# Patient Record
Sex: FEMALE | Race: WHITE | NOT HISPANIC OR LATINO
[De-identification: names, ages, dates, MRNs, and addresses within clinical notes are randomized per-mention and may not be internally consistent; named-entity substitution may affect disease eponyms.]

---

## 2017-03-06 ENCOUNTER — RESULT REVIEW (OUTPATIENT)
Age: 72
End: 2017-03-06

## 2017-11-01 ENCOUNTER — EMERGENCY (EMERGENCY)
Facility: HOSPITAL | Age: 72
LOS: 0 days | Discharge: HOME | End: 2017-11-01
Attending: EMERGENCY MEDICINE | Admitting: EMERGENCY MEDICINE
Payer: MEDICARE

## 2017-11-01 VITALS
HEART RATE: 80 BPM | SYSTOLIC BLOOD PRESSURE: 158 MMHG | RESPIRATION RATE: 18 BRPM | DIASTOLIC BLOOD PRESSURE: 102 MMHG | OXYGEN SATURATION: 100 % | TEMPERATURE: 97 F

## 2017-11-01 VITALS — WEIGHT: 110.01 LBS

## 2017-11-01 PROCEDURE — 99284 EMERGENCY DEPT VISIT MOD MDM: CPT

## 2017-11-01 PROCEDURE — 70450 CT HEAD/BRAIN W/O DYE: CPT | Mod: 26

## 2017-11-01 PROCEDURE — 93010 ELECTROCARDIOGRAM REPORT: CPT

## 2017-11-01 RX ADMIN — Medication 0.1 MILLIGRAM(S): at 15:29

## 2017-11-01 RX ADMIN — Medication 0.1 MILLIGRAM(S): at 14:32

## 2017-11-01 NOTE — ED ADULT TRIAGE NOTE - CHIEF COMPLAINT QUOTE
pt  tripped   and fell today lac to upper lip no loc denies blood thinner to meet DR Garcia ,plastic surgeon

## 2017-11-01 NOTE — ED STATDOCS - PROGRESS NOTE DETAILS
73 yo female presents s/p trip and fall while running and fell forward, sustained a laceration to the lip and abrasion to the face. Last tdap appr 1 year ago. Plastics to suture wound. -Markie Bower PA-C

## 2017-11-01 NOTE — ED STATDOCS - OBJECTIVE STATEMENT
73 yo otherwise healthy female presents with lip laceration s/p trip and fall while running today.  Came in to see Plastics Dr. Garcia.  No blood thinners.  Patient is hypertensive in ED which is not normal for her.

## 2017-11-01 NOTE — ED STATDOCS - CARE PLAN
Principal Discharge DX:	Lip laceration, initial encounter  Secondary Diagnosis:	Facial injury, initial encounter

## 2017-11-01 NOTE — ED STATDOCS - ATTENDING CONTRIBUTION TO CARE
I, Ricky Crump, performed the initial face to face bedside interview with this patient regarding history of present illness, review of symptoms and relevant past medical, social and family history.  I completed an independent physical examination.  I was the initial provider who evaluated this patient. I have signed out the follow up of any pending tests (i.e. labs, radiological studies) to the ACP.  I have communicated the patient’s plan of care and disposition with the ACP.  The history, relevant review of systems, past medical and surgical history, medical decision making, and physical examination was documented by the scribe in my presence and I attest to the accuracy of the documentation.
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
## 2017-11-11 DIAGNOSIS — S01.511A LACERATION WITHOUT FOREIGN BODY OF LIP, INITIAL ENCOUNTER: ICD-10-CM
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
## 2017-11-11 DIAGNOSIS — S01.81XA LACERATION WITHOUT FOREIGN BODY OF OTHER PART OF HEAD, INITIAL ENCOUNTER: ICD-10-CM

## 2020-05-11 ENCOUNTER — APPOINTMENT (OUTPATIENT)
Dept: GASTROENTEROLOGY | Facility: CLINIC | Age: 75
End: 2020-05-11
Payer: MEDICARE

## 2020-05-11 PROCEDURE — 99442: CPT | Mod: CR

## 2020-05-16 ENCOUNTER — APPOINTMENT (OUTPATIENT)
Dept: DISASTER EMERGENCY | Facility: CLINIC | Age: 75
End: 2020-05-16

## 2020-05-17 LAB — SARS-COV-2 N GENE NPH QL NAA+PROBE: NOT DETECTED

## 2020-05-19 ENCOUNTER — APPOINTMENT (OUTPATIENT)
Dept: DISASTER EMERGENCY | Facility: CLINIC | Age: 75
End: 2020-05-19

## 2020-05-19 DIAGNOSIS — Z01.818 ENCOUNTER FOR OTHER PREPROCEDURAL EXAMINATION: ICD-10-CM

## 2020-05-20 LAB — SARS-COV-2 N GENE NPH QL NAA+PROBE: NOT DETECTED

## 2020-05-21 ENCOUNTER — APPOINTMENT (OUTPATIENT)
Dept: GASTROENTEROLOGY | Facility: AMBULATORY MEDICAL SERVICES | Age: 75
End: 2020-05-21
Payer: MEDICARE

## 2020-05-21 ENCOUNTER — RESULT REVIEW (OUTPATIENT)
Age: 75
End: 2020-05-21

## 2020-05-21 PROCEDURE — 43239 EGD BIOPSY SINGLE/MULTIPLE: CPT

## 2020-06-25 ENCOUNTER — APPOINTMENT (OUTPATIENT)
Dept: GASTROENTEROLOGY | Facility: CLINIC | Age: 75
End: 2020-06-25
Payer: MEDICARE

## 2020-06-25 PROCEDURE — 99441: CPT | Mod: 95

## 2020-06-28 NOTE — ASSESSMENT
[FreeTextEntry1] : 76yo female with anemia and gastric ulcer\par 1. anemia check labs in 2 weeks\par if not improving will check repeat egd to document healing of ulcer and colonoscopy as none in 3 years\par She could have concomitant colon lesion

## 2020-06-28 NOTE — REVIEW OF SYSTEMS
[Feeling Poorly] : feeling poorly [Feeling Tired] : feeling tired [FreeTextEntry2] : now improving [Negative] : Heme/Lymph

## 2020-06-28 NOTE — HISTORY OF PRESENT ILLNESS
[Home] : at home, [unfilled] , at the time of the visit. [Spouse] : spouse [Verbal consent obtained from patient] : the patient, [unfilled] [Medical Office: ([facility])___] : at the medical office located in  [de-identified] : 74yo male telephonic visit \par Hx started with hgb 7.  ON egd she had ulcer and treating with PPI. However, still some fatigue\par She is now on iron and improving\par By report, her last blood work was done weeks ago and she still had significant anemia

## 2020-08-10 DIAGNOSIS — D62 ACUTE POSTHEMORRHAGIC ANEMIA: ICD-10-CM

## 2020-08-14 LAB
ALBUMIN SERPL ELPH-MCNC: 4.7 G/DL
ALP BLD-CCNC: 54 U/L
ALT SERPL-CCNC: 23 U/L
ANION GAP SERPL CALC-SCNC: 14 MMOL/L
AST SERPL-CCNC: 28 U/L
BASOPHILS # BLD AUTO: 0.08 K/UL
BASOPHILS NFR BLD AUTO: 1.8 %
BILIRUB SERPL-MCNC: 0.3 MG/DL
BUN SERPL-MCNC: 12 MG/DL
CALCIUM SERPL-MCNC: 10.3 MG/DL
CHLORIDE SERPL-SCNC: 99 MMOL/L
CO2 SERPL-SCNC: 26 MMOL/L
CREAT SERPL-MCNC: 0.57 MG/DL
EOSINOPHIL # BLD AUTO: 0.26 K/UL
EOSINOPHIL NFR BLD AUTO: 5.7 %
FERRITIN SERPL-MCNC: 59 NG/ML
GLUCOSE SERPL-MCNC: 81 MG/DL
HCT VFR BLD CALC: 44.3 %
HGB BLD-MCNC: 14.1 G/DL
IMM GRANULOCYTES NFR BLD AUTO: 0.2 %
IRON SATN MFR SERPL: 37 %
IRON SERPL-MCNC: 128 UG/DL
LYMPHOCYTES # BLD AUTO: 0.98 K/UL
LYMPHOCYTES NFR BLD AUTO: 21.4 %
MAN DIFF?: NORMAL
MCHC RBC-ENTMCNC: 27.6 PG
MCHC RBC-ENTMCNC: 31.8 GM/DL
MCV RBC AUTO: 86.7 FL
MONOCYTES # BLD AUTO: 0.69 K/UL
MONOCYTES NFR BLD AUTO: 15.1 %
NEUTROPHILS # BLD AUTO: 2.55 K/UL
NEUTROPHILS NFR BLD AUTO: 55.8 %
PLATELET # BLD AUTO: 312 K/UL
POTASSIUM SERPL-SCNC: 4.1 MMOL/L
PROT SERPL-MCNC: 6.7 G/DL
RBC # BLD: 5.11 M/UL
RBC # FLD: NORMAL
SODIUM SERPL-SCNC: 138 MMOL/L
TIBC SERPL-MCNC: 345 UG/DL
UIBC SERPL-MCNC: 217 UG/DL
WBC # FLD AUTO: 4.57 K/UL

## 2020-11-09 ENCOUNTER — RX RENEWAL (OUTPATIENT)
Age: 75
End: 2020-11-09

## 2021-05-03 ENCOUNTER — RX RENEWAL (OUTPATIENT)
Age: 76
End: 2021-05-03

## 2021-09-09 ENCOUNTER — APPOINTMENT (OUTPATIENT)
Dept: INTERNAL MEDICINE | Facility: CLINIC | Age: 76
End: 2021-09-09
Payer: MEDICARE

## 2021-09-09 VITALS
RESPIRATION RATE: 14 BRPM | HEART RATE: 88 BPM | HEIGHT: 64 IN | BODY MASS INDEX: 19.46 KG/M2 | WEIGHT: 114 LBS | TEMPERATURE: 97.6 F | DIASTOLIC BLOOD PRESSURE: 80 MMHG | SYSTOLIC BLOOD PRESSURE: 136 MMHG

## 2021-09-09 VITALS — OXYGEN SATURATION: 97 %

## 2021-09-09 DIAGNOSIS — Z87.39 PERSONAL HISTORY OF OTHER DISEASES OF THE MUSCULOSKELETAL SYSTEM AND CONNECTIVE TISSUE: ICD-10-CM

## 2021-09-09 DIAGNOSIS — Z87.19 PERSONAL HISTORY OF OTHER DISEASES OF THE DIGESTIVE SYSTEM: ICD-10-CM

## 2021-09-09 DIAGNOSIS — R92.8 OTHER ABNORMAL AND INCONCLUSIVE FINDINGS ON DIAGNOSTIC IMAGING OF BREAST: ICD-10-CM

## 2021-09-09 PROCEDURE — 99204 OFFICE O/P NEW MOD 45 MIN: CPT

## 2021-09-09 NOTE — HEALTH RISK ASSESSMENT
[Good] : ~his/her~  mood as  good [No] : No [No falls in past year] : Patient reported no falls in the past year [With Significant Other] : lives with significant other [] :  [Fully functional (bathing, dressing, toileting, transferring, walking, feeding)] : Fully functional (bathing, dressing, toileting, transferring, walking, feeding) [] : No 167.64

## 2021-09-09 NOTE — REVIEW OF SYSTEMS
[Memory Loss] : memory loss [Fever] : no fever [Chills] : no chills [Fatigue] : no fatigue [Pain] : no pain [Earache] : no earache [Nasal Discharge] : no nasal discharge [Sore Throat] : no sore throat [Chest Pain] : no chest pain [Palpitations] : no palpitations [Lower Ext Edema] : no lower extremity edema [Shortness Of Breath] : no shortness of breath [Cough] : no cough [Abdominal Pain] : no abdominal pain [Vomiting] : no vomiting [Heartburn] : no heartburn [Melena] : no melena [Dysuria] : no dysuria [Itching] : no itching [Headache] : no headache [Dizziness] : no dizziness [Fainting] : no fainting

## 2021-09-09 NOTE — PHYSICAL EXAM
[No Acute Distress] : no acute distress [Well Nourished] : well nourished [Well Developed] : well developed [Well-Appearing] : well-appearing [Normal Voice/Communication] : normal voice/communication [Normal Sclera/Conjunctiva] : normal sclera/conjunctiva [PERRL] : pupils equal round and reactive to light [Normal Outer Ear/Nose] : the outer ears and nose were normal in appearance [Supple] : supple [No Respiratory Distress] : no respiratory distress  [Clear to Auscultation] : lungs were clear to auscultation bilaterally [Normal Rate] : normal rate  [Normal S1, S2] : normal S1 and S2 [Pedal Pulses Present] : the pedal pulses are present [No Edema] : there was no peripheral edema [Soft] : abdomen soft [Non Tender] : non-tender [Normal Bowel Sounds] : normal bowel sounds [Grossly Normal Strength/Tone] : grossly normal strength/tone [Coordination Grossly Intact] : coordination grossly intact [Normal Gait] : normal gait [Speech Grossly Normal] : speech grossly normal [Normal Mood] : the mood was normal [de-identified] : Alert. Is not oriented to time (does not know year/month/day). Able to recall children's names but can't remember where they both live or how many grandchildren she has. Does not know president.

## 2021-09-09 NOTE — HISTORY OF PRESENT ILLNESS
[Spouse] : spouse [FreeTextEntry1] : ADRIÁN.  [de-identified] : 77 y/o is in the office to establish care.  Patient reports she is doing well overall.  Presents to  office with  who states she does have memory loss.  Patient is able to shower herself, dress herself, use the bathroom, cleans the house and does the laundry.  Has been states she can power walk about 2 miles. \par \par Sees rheumatologist for fibromyalgia.  On duloxetine with good effect.  Patient's  reports that memory loss has been discussed with rheumatologist.  Supportive therapy (diet, sleep, exercise) was recommended.\par \par Patient also sees Dr. Rolle in [place] for GERD.  On pantoprazole.  Patient denies any abdominal pain or blood in the stool.  Patient's  states the last time she had a colonoscopy it took 6 months for her to recover.  Does not want to do this again.

## 2021-09-09 NOTE — PLAN
[FreeTextEntry1] : 76-year-old female for new patient office visit.\par \par Memory loss. Referral to neurology was advised for further work-up.  However, patient's  declined.  Routine blood work performed today.  Patient's  was encouraged to continue supportive therapy.\par \par GERD.  Patient to continue on pantoprazole.  Keep follow-up with GI.\par \par Fibromyalgia.  Stable on duloxetine.  Recommendations for further management per rheumatology.\par \par Health maintenance.  On review of documentation it looks as though patient did have a history of abnormal mammogram in 2014.  MRI performed after was found to be negative.  Repeat imaging was recommended in 6 months.  Patient's  states she had this done about two years ago and was fine. He declines any further breast screening for pt at this time. \par \par UTD w/ COVID vaccine. \par \par Pt  also declines any further screening for colon ca. \par \par Risks of declination to proposed testing/referral as above discussed in detail. Pt  is aware and understands these risks. Does not want any further work-up for pt at this time. \par \par All questions answered. Pt  voiced understanding and in agreement to above plan. RTC in 3 mths for f/u.

## 2021-09-13 LAB
ALBUMIN SERPL ELPH-MCNC: 4.8 G/DL
ALP BLD-CCNC: 59 U/L
ALT SERPL-CCNC: 19 U/L
ANION GAP SERPL CALC-SCNC: 12 MMOL/L
AST SERPL-CCNC: 22 U/L
BASOPHILS # BLD AUTO: 0.08 K/UL
BASOPHILS NFR BLD AUTO: 1.3 %
BILIRUB SERPL-MCNC: 0.4 MG/DL
BUN SERPL-MCNC: 11 MG/DL
CALCIUM SERPL-MCNC: 10.2 MG/DL
CHLORIDE SERPL-SCNC: 100 MMOL/L
CO2 SERPL-SCNC: 28 MMOL/L
CREAT SERPL-MCNC: 0.58 MG/DL
EOSINOPHIL # BLD AUTO: 0.72 K/UL
EOSINOPHIL NFR BLD AUTO: 11.7 %
ESTIMATED AVERAGE GLUCOSE: 108 MG/DL
FOLATE SERPL-MCNC: >20 NG/ML
GLUCOSE SERPL-MCNC: 106 MG/DL
HBA1C MFR BLD HPLC: 5.4 %
HCT VFR BLD CALC: 46 %
HGB BLD-MCNC: 15.2 G/DL
IMM GRANULOCYTES NFR BLD AUTO: 0.3 %
LYMPHOCYTES # BLD AUTO: 0.8 K/UL
LYMPHOCYTES NFR BLD AUTO: 13 %
MAN DIFF?: NORMAL
MCHC RBC-ENTMCNC: 31.4 PG
MCHC RBC-ENTMCNC: 33 GM/DL
MCV RBC AUTO: 95 FL
MONOCYTES # BLD AUTO: 0.74 K/UL
MONOCYTES NFR BLD AUTO: 12 %
NEUTROPHILS # BLD AUTO: 3.8 K/UL
NEUTROPHILS NFR BLD AUTO: 61.7 %
PLATELET # BLD AUTO: 322 K/UL
POTASSIUM SERPL-SCNC: 3.5 MMOL/L
PROT SERPL-MCNC: 6.8 G/DL
RBC # BLD: 4.84 M/UL
RBC # FLD: 12.7 %
SODIUM SERPL-SCNC: 140 MMOL/L
TSH SERPL-ACNC: 2.38 UIU/ML
VIT B12 SERPL-MCNC: 1451 PG/ML
WBC # FLD AUTO: 6.16 K/UL

## 2021-10-28 ENCOUNTER — RX RENEWAL (OUTPATIENT)
Age: 76
End: 2021-10-28

## 2021-12-08 ENCOUNTER — RX RENEWAL (OUTPATIENT)
Age: 76
End: 2021-12-08

## 2021-12-16 ENCOUNTER — APPOINTMENT (OUTPATIENT)
Dept: INTERNAL MEDICINE | Facility: CLINIC | Age: 76
End: 2021-12-16
Payer: MEDICARE

## 2021-12-16 ENCOUNTER — NON-APPOINTMENT (OUTPATIENT)
Age: 76
End: 2021-12-16

## 2021-12-16 VITALS
DIASTOLIC BLOOD PRESSURE: 60 MMHG | RESPIRATION RATE: 14 BRPM | HEIGHT: 64 IN | HEART RATE: 84 BPM | SYSTOLIC BLOOD PRESSURE: 122 MMHG | WEIGHT: 114 LBS | OXYGEN SATURATION: 97 % | BODY MASS INDEX: 19.46 KG/M2 | TEMPERATURE: 97.6 F

## 2021-12-16 PROCEDURE — 99212 OFFICE O/P EST SF 10 MIN: CPT

## 2021-12-16 NOTE — HISTORY OF PRESENT ILLNESS
[Spouse] : spouse [FreeTextEntry1] : F/U memory loss [de-identified] : 77 y/o presents for follow up of memory loss. Pt states other than chronic pain from arthritis she is doing well. Activity level remains above average. Spouse does not note any new changes. She eating/drinking/voiding WNL. No questions/concerns at this time.

## 2021-12-16 NOTE — PHYSICAL EXAM
[No Acute Distress] : no acute distress [Well Nourished] : well nourished [Well Developed] : well developed [Well-Appearing] : well-appearing [Normal Voice/Communication] : normal voice/communication [Normal Sclera/Conjunctiva] : normal sclera/conjunctiva [PERRL] : pupils equal round and reactive to light [Normal Outer Ear/Nose] : the outer ears and nose were normal in appearance [Supple] : supple [No Respiratory Distress] : no respiratory distress  [Clear to Auscultation] : lungs were clear to auscultation bilaterally [Normal Rate] : normal rate  [Normal S1, S2] : normal S1 and S2 [Pedal Pulses Present] : the pedal pulses are present [No Edema] : there was no peripheral edema [Soft] : abdomen soft [Non Tender] : non-tender [Normal Bowel Sounds] : normal bowel sounds [Grossly Normal Strength/Tone] : grossly normal strength/tone [Coordination Grossly Intact] : coordination grossly intact [Normal Gait] : normal gait [Speech Grossly Normal] : speech grossly normal [Normal Mood] : the mood was normal [de-identified] : Alert. At baseline.

## 2021-12-16 NOTE — PLAN
[FreeTextEntry1] : 76-year-old female for follow-up of memory loss.  No new changes noted at this time.  Patient's spouse declines any further work-up in this regard currently.  Continue appropriate diet and exercise.\par \par Health maintenance.  Patient has received both flu shot and booster dose for Covid.\par \par All questions answered.  Patient and spouse voiced understanding and agreement above plan.  Return to clinic as recommended.

## 2022-03-10 ENCOUNTER — APPOINTMENT (OUTPATIENT)
Dept: INTERNAL MEDICINE | Facility: CLINIC | Age: 77
End: 2022-03-10
Payer: MEDICARE

## 2022-03-10 VITALS
RESPIRATION RATE: 14 BRPM | DIASTOLIC BLOOD PRESSURE: 62 MMHG | HEART RATE: 82 BPM | SYSTOLIC BLOOD PRESSURE: 128 MMHG | TEMPERATURE: 98.4 F

## 2022-03-10 PROCEDURE — 99212 OFFICE O/P EST SF 10 MIN: CPT

## 2022-03-10 NOTE — PHYSICAL EXAM
[No Acute Distress] : no acute distress [Well Nourished] : well nourished [Well Developed] : well developed [Well-Appearing] : well-appearing [Normal Voice/Communication] : normal voice/communication [Normal Sclera/Conjunctiva] : normal sclera/conjunctiva [No Respiratory Distress] : no respiratory distress  [Clear to Auscultation] : lungs were clear to auscultation bilaterally [Normal Rate] : normal rate  [Normal S1, S2] : normal S1 and S2 [No Edema] : there was no peripheral edema [Soft] : abdomen soft [Non Tender] : non-tender [Normal Bowel Sounds] : normal bowel sounds [Speech Grossly Normal] : speech grossly normal [Normal Mood] : the mood was normal [de-identified] : Alert. At baseline.

## 2022-03-10 NOTE — HISTORY OF PRESENT ILLNESS
[FreeTextEntry1] : Follow-up dementia [de-identified] : 77-year-old female for follow-up dementia.  Per , patient is sleeping well.  He keeps her very active physically.  Patient is able to walk for approximately half an hour per day.  No new changes noted.

## 2022-03-10 NOTE — PLAN
[FreeTextEntry1] : 77-year-old female for follow-up memory loss.  Stable.  Patient  was encouraged to continue cognitive engagement with activities such as doing word puzzles.  Importance of keeping patient on consistent/routine schedule discussed as well.  All questions answered.  Patient voiced understanding and agreement above plan.  Return to clinic as recommended.

## 2022-04-28 ENCOUNTER — RX RENEWAL (OUTPATIENT)
Age: 77
End: 2022-04-28

## 2022-06-09 ENCOUNTER — RX RENEWAL (OUTPATIENT)
Age: 77
End: 2022-06-09

## 2022-09-21 ENCOUNTER — APPOINTMENT (OUTPATIENT)
Dept: FAMILY MEDICINE | Facility: CLINIC | Age: 77
End: 2022-09-21

## 2022-09-21 ENCOUNTER — NON-APPOINTMENT (OUTPATIENT)
Age: 77
End: 2022-09-21

## 2022-09-21 VITALS
BODY MASS INDEX: 19.29 KG/M2 | OXYGEN SATURATION: 96 % | TEMPERATURE: 97.6 F | WEIGHT: 113 LBS | HEART RATE: 80 BPM | HEIGHT: 64 IN | RESPIRATION RATE: 15 BRPM

## 2022-09-21 DIAGNOSIS — K27.9 PEPTIC ULCER, SITE UNSPECIFIED, UNSPECIFIED AS ACUTE OR CHRONIC, W/OUT HEMORRHAGE OR PERFORATION: ICD-10-CM

## 2022-09-21 DIAGNOSIS — Z00.00 ENCOUNTER FOR GENERAL ADULT MEDICAL EXAMINATION W/OUT ABNORMAL FINDINGS: ICD-10-CM

## 2022-09-21 PROCEDURE — G0439: CPT

## 2022-09-21 PROCEDURE — 36415 COLL VENOUS BLD VENIPUNCTURE: CPT | Mod: 59

## 2022-09-21 NOTE — HISTORY OF PRESENT ILLNESS
[FreeTextEntry1] : Wellness visit [de-identified] : 76 y/o female presents for annual Medicare wellness visit.\par \par History of dementia. Per , patient is sleeping well. He keeps her very active physically. Patient is able to walk for approximately half an hour per day. No new changes noted. \par \par Hx of hearing loss. Does have hearing aids. Pt  unsure if they are still working. \par \par Sees rheumatologist for fibromyalgia. On duloxetine with good effect. Patient's  reports that memory loss has been discussed with rheumatologist. Supportive therapy (diet, sleep, exercise) was recommended.\par \par Patient also sees Dr. Rolle in [place] for GERD. On pantoprazole. \par \par Hyperlipidemia.  On atorvastatin 40 mg daily.

## 2022-09-21 NOTE — HEALTH RISK ASSESSMENT
[No falls in past year] : Patient reported no falls in the past year [1] : 1) Little interest or pleasure doing things for several days (1) [0] : 2) Feeling down, depressed, or hopeless: Not at all (0) [PHQ-2 Negative - No further assessment needed] : PHQ-2 Negative - No further assessment needed [[id number]] : 1

## 2022-09-21 NOTE — PLAN
[FreeTextEntry1] : 78 yo F for annual wellness visit.  Routine labs obtained today.\par \par Memory loss.  Patient  is agreeable to referral to neurology at this time.\par \par Hearing loss.  ENT referral placed.\par \par GERD. Patient to continue on pantoprazole. Keep follow-up with GI.\par \par Fibromyalgia. Stable on duloxetine. Recommendations for further management per rheumatology.\par \par Health maintenance. On review of documentation it looks as though patient did have a history of abnormal mammogram in 2014. MRI performed after was found to be negative. Repeat imaging was recommended in 6 months. Patient's  states she had this done about two years ago and was fine. He declines any further breast screening for pt at this time. \par \par UTD w/ COVID and flu vaccine. \par \par Risks of declination to proposed testing/referral as above discussed in detail. Pt  is aware and understands these risks. Does not want any further work-up for pt at this time. \par \par All questions answered. Pt  voiced understanding and in agreement to above plan. RTC in 6 mths for f/u. \par

## 2022-09-21 NOTE — PHYSICAL EXAM
[No Acute Distress] : no acute distress [Well Nourished] : well nourished [Well Developed] : well developed [Well-Appearing] : well-appearing [Normal Voice/Communication] : normal voice/communication [Normal Sclera/Conjunctiva] : normal sclera/conjunctiva [No Respiratory Distress] : no respiratory distress  [Clear to Auscultation] : lungs were clear to auscultation bilaterally [Normal Rate] : normal rate  [Normal S1, S2] : normal S1 and S2 [No Edema] : there was no peripheral edema [Soft] : abdomen soft [Non Tender] : non-tender [Normal Bowel Sounds] : normal bowel sounds [Speech Grossly Normal] : speech grossly normal [Normal Mood] : the mood was normal [de-identified] : Alert. At baseline.  [de-identified] : Patient is not oriented to time.

## 2022-09-26 ENCOUNTER — APPOINTMENT (OUTPATIENT)
Dept: ORTHOPEDIC SURGERY | Facility: CLINIC | Age: 77
End: 2022-09-26

## 2022-09-26 ENCOUNTER — NON-APPOINTMENT (OUTPATIENT)
Age: 77
End: 2022-09-26

## 2022-09-26 LAB
ALBUMIN SERPL ELPH-MCNC: 4.7 G/DL
ALP BLD-CCNC: 59 U/L
ALT SERPL-CCNC: 21 U/L
ANION GAP SERPL CALC-SCNC: 12 MMOL/L
AST SERPL-CCNC: 27 U/L
BASOPHILS # BLD AUTO: 0.11 K/UL
BASOPHILS NFR BLD AUTO: 1.7 %
BILIRUB SERPL-MCNC: 0.6 MG/DL
BUN SERPL-MCNC: 9 MG/DL
CALCIUM SERPL-MCNC: 9.9 MG/DL
CHLORIDE SERPL-SCNC: 102 MMOL/L
CHOLEST SERPL-MCNC: 199 MG/DL
CO2 SERPL-SCNC: 28 MMOL/L
CREAT SERPL-MCNC: 0.58 MG/DL
EGFR: 92 ML/MIN/1.73M2
EOSINOPHIL # BLD AUTO: 0.52 K/UL
EOSINOPHIL NFR BLD AUTO: 7.8 %
ESTIMATED AVERAGE GLUCOSE: 114 MG/DL
GLUCOSE SERPL-MCNC: 95 MG/DL
HBA1C MFR BLD HPLC: 5.6 %
HCT VFR BLD CALC: 44 %
HDLC SERPL-MCNC: 88 MG/DL
HGB BLD-MCNC: 14.5 G/DL
IMM GRANULOCYTES NFR BLD AUTO: 0.6 %
LDLC SERPL CALC-MCNC: 86 MG/DL
LYMPHOCYTES # BLD AUTO: 1.35 K/UL
LYMPHOCYTES NFR BLD AUTO: 20.3 %
MAN DIFF?: NORMAL
MCHC RBC-ENTMCNC: 30.7 PG
MCHC RBC-ENTMCNC: 33 GM/DL
MCV RBC AUTO: 93 FL
MONOCYTES # BLD AUTO: 0.89 K/UL
MONOCYTES NFR BLD AUTO: 13.4 %
NEUTROPHILS # BLD AUTO: 3.73 K/UL
NEUTROPHILS NFR BLD AUTO: 56.2 %
NONHDLC SERPL-MCNC: 111 MG/DL
PLATELET # BLD AUTO: 343 K/UL
POTASSIUM SERPL-SCNC: 3.9 MMOL/L
PROT SERPL-MCNC: 6.6 G/DL
RBC # BLD: 4.73 M/UL
RBC # FLD: 13.4 %
SODIUM SERPL-SCNC: 141 MMOL/L
TRIGL SERPL-MCNC: 125 MG/DL
TSH SERPL-ACNC: 2.54 UIU/ML
WBC # FLD AUTO: 6.64 K/UL

## 2022-09-26 PROCEDURE — 28490 TREAT BIG TOE FRACTURE: CPT | Mod: LT,TA

## 2022-09-26 PROCEDURE — 73630 X-RAY EXAM OF FOOT: CPT | Mod: LT

## 2022-09-26 PROCEDURE — 99204 OFFICE O/P NEW MOD 45 MIN: CPT

## 2022-09-26 NOTE — PHYSICAL EXAM
[de-identified] : Left foot Physical Examination:\par \par General: Alert and oriented x3.  In no acute distress.  Pleasant in nature with a normal affect.  No apparent respiratory distress. \par Erythema, Warmth, Rubor: Negative\par Swelling: Positive swelling of the great toe.\par \par ROM Ankle:\par 1. Dorsiflexion: 10 degrees\par 2. Plantarflexion: 40 degrees\par 3. Inversion: 20 degrees\par 4. Eversion: 10 degrees\par \par ROM of digits: Normal\par \par Pes Planus: Negative\par Pes Cavus: Negative\par \par Bunion: Negative\par Maranda's Bunion (Bunionette): Negative\par Hammer Toe Deformity/Deformities: Negative\par \par Tenderness to Palpation: \par 1. Heel Pain: Negative\par 2. Midfoot Pain: Negative\par 3. First MTP Joint: Negative\par 4. Lis Franc Joint: Negative\par \par Tenderness Metatarsals:\par 1st MT: Negative\par 2nd MT: Negative\par 3rd MT: Negative\par 4th MT: Negative\par 5th MT: Negative\par Base of the 5th MT: Negative\par \par Ligament Pain:\par 1. Lis Franc Ligament: Negative\par 2. Plantar Fascia Ligament: Negative\par \par Strength: \par 5/5 TA/GS/EHL/FHL/EDL/ADD/ABD\par \par Pulses: 2+ DP/PT Pulses\par \par Capillary Refill Toes: <2 seconds\par \par Neuro: Intact motor and sensory throughout\par \par Additional Test:\par 1. Chatman's Squeeze Test: Negative\par 2. Calcaneal Squeeze Test: Negative\par \par *Pain when palpating the proximal phalanx of the great toe/DIP of the great toe. [de-identified] : X-rays of the left foot reviewed, 9/26/2022, 3 views:  Distal phalanx great toe nondisplaced fracture.

## 2022-09-26 NOTE — ADDENDUM
[FreeTextEntry1] : I, Charlene Underwood, acted solely as a scribe for Dr. Lionel Kurtz on this date 09/26/2022.\par \par All medical record entries made by the Scribe were at my, Dr. Lionel Kurtz, direction and personally dictated by me on 09/26/2022. I have reviewed the chart and agree that the record accurately reflects my personal performance of the history, physical exam, assessment and plan. I have also personally directed, reviewed, and agreed with the chart.	\par

## 2022-09-26 NOTE — HISTORY OF PRESENT ILLNESS
[FreeTextEntry1] : The patient is a 77-year-old female who took an accidental fall this past Thursday while at her home and injured her left great toe.  She went to an urgent care where they told her she broke her great toe and placed her in a hard soled shoe and she is using 1 crutch for balance.  Her pain scale today is a 4 out of 10.  She does have some redness around the great toe.  Denies fevers, chills, night sweats.  No other complaints.

## 2022-09-26 NOTE — DISCUSSION/SUMMARY
[de-identified] : Today I had a lengthy discussion with the patient regarding their left great toe fracture. I have addressed all the patient's concerns surrounding the pathology of their condition. XR imaging was completed in office today and results were reviewed with the patient. At this time, I recommend that the patient continue to utilize a stiff medical shoe for the next 2-3 weeks. She may then transition from the stiff medical shoe to regular supportive sneakers. Recommended to the patient to wean off the crutches as tolerated. 		\par \par \par The patient understood and verbally agreed to the treatment plan. All of their questions were answered and they were satisfied with the visit. The patient should call the office if they have any questions or experience worsening symptoms. I would like to see the patient back in the office in 4-6 weeks to reassess their condition. 				\par

## 2022-10-11 ENCOUNTER — RX CHANGE (OUTPATIENT)
Age: 77
End: 2022-10-11

## 2022-10-31 ENCOUNTER — APPOINTMENT (OUTPATIENT)
Dept: ORTHOPEDIC SURGERY | Facility: CLINIC | Age: 77
End: 2022-10-31

## 2022-10-31 DIAGNOSIS — S92.402A DISPLACED UNSPECIFIED FRACTURE OF LEFT GREAT TOE, INITIAL ENCOUNTER FOR CLOSED FRACTURE: ICD-10-CM

## 2022-10-31 PROCEDURE — 73630 X-RAY EXAM OF FOOT: CPT | Mod: LT

## 2022-10-31 PROCEDURE — 99024 POSTOP FOLLOW-UP VISIT: CPT

## 2022-10-31 NOTE — ADDENDUM
[FreeTextEntry1] : I, Charlene Underwood, acted solely as a scribe for Dr. Lionel Kurtz on this date 10/31/2022.\par \par All medical record entries made by the Scribe were at my, Dr. Lionel Kurtz, direction and personally dictated by me on 10/31/2022. I have reviewed the chart and agree that the record accurately reflects my personal performance of the history, physical exam, assessment and plan. I have also personally directed, reviewed, and agreed with the chart.	\par

## 2022-10-31 NOTE — PHYSICAL EXAM
[de-identified] : Left foot Physical Examination:\par \par General: Alert and oriented x3.  In no acute distress.  Pleasant in nature with a normal affect.  No apparent respiratory distress. \par Erythema, Warmth, Rubor: Negative\par Swelling: Positive swelling of the great toe, improved.\par \par ROM Ankle:\par 1. Dorsiflexion: 10 degrees\par 2. Plantarflexion: 40 degrees\par 3. Inversion: 20 degrees\par 4. Eversion: 10 degrees\par \par ROM of digits: Normal\par \par Pes Planus: Negative\par Pes Cavus: Negative\par \par Bunion: Negative\par Maranda's Bunion (Bunionette): Negative\par Hammer Toe Deformity/Deformities: Negative\par \par Tenderness to Palpation: \par 1. Heel Pain: Negative\par 2. Midfoot Pain: Negative\par 3. First MTP Joint: Negative\par 4. Lis Franc Joint: Negative\par \par Tenderness Metatarsals:\par 1st MT: Negative\par 2nd MT: Negative\par 3rd MT: Negative\par 4th MT: Negative\par 5th MT: Negative\par Base of the 5th MT: Negative\par \par Ligament Pain:\par 1. Lis Franc Ligament: Negative\par 2. Plantar Fascia Ligament: Negative\par \par Strength: \par 5/5 TA/GS/EHL/FHL/EDL/ADD/ABD\par \par Pulses: 2+ DP/PT Pulses\par \par Capillary Refill Toes: <2 seconds\par \par Neuro: Intact motor and sensory throughout\par \par Additional Test:\par 1. Chatman's Squeeze Test: Negative\par 2. Calcaneal Squeeze Test: Negative\par \par *Pain when palpating the proximal phalanx of the great toe/DIP of the great toe. [de-identified] : 3V of the left foot were ordered, obtained, and reviewed by me today, 10/31/2022, revealed: Distal phalanx great toe nondisplaced fracture, with healing.

## 2022-10-31 NOTE — HISTORY OF PRESENT ILLNESS
[FreeTextEntry1] : 10/31/2022: The patient is a 77 year old female presenting for a follow-up evaluation of her great toe fracture. The patient is accompanied by their spouse. She has walked over a 1 mile as per her spouse. They are concerned in regards to the length of her toenails. She is wearing regular shoes and is walking without assistance. No other complaints. \par \par 9/26/2022: The patient is a 77-year-old female who took an accidental fall this past Thursday while at her home and injured her left great toe.  She went to an urgent care where they told her she broke her great toe and placed her in a hard soled shoe and she is using 1 crutch for balance.  Her pain scale today is a 4 out of 10.  She does have some redness around the great toe.  Denies fevers, chills, night sweats.  No other complaints.

## 2022-10-31 NOTE — DISCUSSION/SUMMARY
[de-identified] : Today I had a lengthy discussion with the patient regarding their left great toe fracture. I have addressed all the patient's concerns surrounding the pathology of their condition. XR imaging was completed in office today and results were reviewed with the patient. At this time, the patient may follow up on an as needed basis for reassessment. We discussed follow-up evaluation with podiatry in regards to the patient's toenails. The patient understood and verbally agreed to the treatment plan. All of their questions were answered and they were satisfied with the visit. The patient should call the office if they have any questions or experience worsening symptoms.

## 2022-10-31 NOTE — REASON FOR VISIT
[Follow-Up Visit] : a follow-up visit for [Spouse] : spouse [FreeTextEntry2] : Left great toe fracture

## 2023-01-25 ENCOUNTER — APPOINTMENT (OUTPATIENT)
Dept: FAMILY MEDICINE | Facility: CLINIC | Age: 78
End: 2023-01-25
Payer: MEDICARE

## 2023-01-25 VITALS
SYSTOLIC BLOOD PRESSURE: 120 MMHG | DIASTOLIC BLOOD PRESSURE: 70 MMHG | OXYGEN SATURATION: 99 % | RESPIRATION RATE: 15 BRPM | HEART RATE: 77 BPM | TEMPERATURE: 98.3 F

## 2023-01-25 DIAGNOSIS — R53.81 OTHER MALAISE: ICD-10-CM

## 2023-01-25 DIAGNOSIS — R53.83 OTHER MALAISE: ICD-10-CM

## 2023-01-25 PROCEDURE — 36415 COLL VENOUS BLD VENIPUNCTURE: CPT | Mod: 59

## 2023-01-25 PROCEDURE — 99213 OFFICE O/P EST LOW 20 MIN: CPT | Mod: 25

## 2023-01-25 NOTE — REVIEW OF SYSTEMS
[Fever] : no fever [Chills] : no chills [Fatigue] : no fatigue [Pain] : no pain [Earache] : no earache [Nasal Discharge] : no nasal discharge [Sore Throat] : no sore throat [Chest Pain] : no chest pain [Palpitations] : no palpitations [Lower Ext Edema] : no lower extremity edema [Shortness Of Breath] : no shortness of breath [Cough] : no cough [Abdominal Pain] : no abdominal pain [Vomiting] : no vomiting [Heartburn] : no heartburn [Melena] : no melena [Dysuria] : no dysuria [Itching] : no itching [Headache] : no headache [Dizziness] : no dizziness [Fainting] : no fainting [Memory Loss] : memory loss

## 2023-01-25 NOTE — PLAN
[FreeTextEntry1] : 77-year-old female for fatigue.  We will recheck labs.  Suspect secondary to fibromyalgia.  Signs and symptoms warranting further eval advised.  All questions answered.  Patient voiced understanding and in agreement to above plan.  Return to clinic as recommended.

## 2023-01-25 NOTE — HISTORY OF PRESENT ILLNESS
[FreeTextEntry1] : Follow-up [de-identified] : 78 y/o female presents for follow up from rheumatology.  Patient is noting fatigue.  Does sleep well.  History of fibromyalgia.  Eating/drinking/voiding/BM within normal limits.

## 2023-01-25 NOTE — PHYSICAL EXAM
[No Acute Distress] : no acute distress [Well Nourished] : well nourished [Well Developed] : well developed [Well-Appearing] : well-appearing [Normal Voice/Communication] : normal voice/communication [Normal Sclera/Conjunctiva] : normal sclera/conjunctiva [No Respiratory Distress] : no respiratory distress  [Clear to Auscultation] : lungs were clear to auscultation bilaterally [Normal Rate] : normal rate  [Normal S1, S2] : normal S1 and S2 [No Edema] : there was no peripheral edema [Soft] : abdomen soft [Non Tender] : non-tender [Normal Bowel Sounds] : normal bowel sounds [Speech Grossly Normal] : speech grossly normal [Normal Mood] : the mood was normal [de-identified] : Alert. At baseline.  [de-identified] : Patient is not oriented to time.

## 2023-01-26 LAB
25(OH)D3 SERPL-MCNC: 62.6 NG/ML
ALBUMIN SERPL ELPH-MCNC: 4.4 G/DL
ALP BLD-CCNC: 64 U/L
ALT SERPL-CCNC: 25 U/L
ANION GAP SERPL CALC-SCNC: 12 MMOL/L
AST SERPL-CCNC: 34 U/L
BASOPHILS # BLD AUTO: 0.09 K/UL
BASOPHILS NFR BLD AUTO: 1.1 %
BILIRUB SERPL-MCNC: 0.4 MG/DL
BUN SERPL-MCNC: 12 MG/DL
CALCIUM SERPL-MCNC: 9.7 MG/DL
CHLORIDE SERPL-SCNC: 101 MMOL/L
CO2 SERPL-SCNC: 27 MMOL/L
CREAT SERPL-MCNC: 0.54 MG/DL
EGFR: 94 ML/MIN/1.73M2
EOSINOPHIL # BLD AUTO: 0.47 K/UL
EOSINOPHIL NFR BLD AUTO: 5.8 %
FERRITIN SERPL-MCNC: 249 NG/ML
FOLATE SERPL-MCNC: >20 NG/ML
GLUCOSE SERPL-MCNC: 91 MG/DL
HCT VFR BLD CALC: 42.7 %
HGB BLD-MCNC: 14.2 G/DL
IMM GRANULOCYTES NFR BLD AUTO: 0.6 %
IRON SATN MFR SERPL: 52 %
IRON SERPL-MCNC: 157 UG/DL
LYMPHOCYTES # BLD AUTO: 1.04 K/UL
LYMPHOCYTES NFR BLD AUTO: 12.8 %
MAN DIFF?: NORMAL
MCHC RBC-ENTMCNC: 31 PG
MCHC RBC-ENTMCNC: 33.3 GM/DL
MCV RBC AUTO: 93.2 FL
MONOCYTES # BLD AUTO: 0.81 K/UL
MONOCYTES NFR BLD AUTO: 10 %
NEUTROPHILS # BLD AUTO: 5.65 K/UL
NEUTROPHILS NFR BLD AUTO: 69.7 %
PLATELET # BLD AUTO: 314 K/UL
POTASSIUM SERPL-SCNC: 3.8 MMOL/L
PROT SERPL-MCNC: 6.4 G/DL
RBC # BLD: 4.58 M/UL
RBC # FLD: 12.7 %
SODIUM SERPL-SCNC: 140 MMOL/L
TIBC SERPL-MCNC: 301 UG/DL
UIBC SERPL-MCNC: 144 UG/DL
VIT B12 SERPL-MCNC: 1523 PG/ML
WBC # FLD AUTO: 8.11 K/UL

## 2023-03-22 ENCOUNTER — APPOINTMENT (OUTPATIENT)
Dept: FAMILY MEDICINE | Facility: CLINIC | Age: 78
End: 2023-03-22
Payer: MEDICARE

## 2023-03-22 VITALS
TEMPERATURE: 98 F | OXYGEN SATURATION: 98 % | DIASTOLIC BLOOD PRESSURE: 78 MMHG | HEART RATE: 70 BPM | SYSTOLIC BLOOD PRESSURE: 116 MMHG

## 2023-03-22 DIAGNOSIS — H91.90 UNSPECIFIED HEARING LOSS, UNSPECIFIED EAR: ICD-10-CM

## 2023-03-22 PROCEDURE — 36415 COLL VENOUS BLD VENIPUNCTURE: CPT | Mod: 59

## 2023-03-22 PROCEDURE — 99213 OFFICE O/P EST LOW 20 MIN: CPT | Mod: 25

## 2023-03-28 LAB
BASOPHILS # BLD AUTO: 0.11 K/UL
BASOPHILS NFR BLD AUTO: 1.7 %
EOSINOPHIL # BLD AUTO: 0.71 K/UL
EOSINOPHIL NFR BLD AUTO: 10.9 %
FERRITIN SERPL-MCNC: 310 NG/ML
FOLATE SERPL-MCNC: >20 NG/ML
HCT VFR BLD CALC: 44.1 %
HGB BLD-MCNC: 14.6 G/DL
IMM GRANULOCYTES NFR BLD AUTO: 0.3 %
IRON SATN MFR SERPL: 40 %
IRON SERPL-MCNC: 121 UG/DL
LYMPHOCYTES # BLD AUTO: 1.38 K/UL
LYMPHOCYTES NFR BLD AUTO: 21.1 %
MAN DIFF?: NORMAL
MCHC RBC-ENTMCNC: 31.3 PG
MCHC RBC-ENTMCNC: 33.1 GM/DL
MCV RBC AUTO: 94.4 FL
MONOCYTES # BLD AUTO: 0.98 K/UL
MONOCYTES NFR BLD AUTO: 15 %
NEUTROPHILS # BLD AUTO: 3.34 K/UL
NEUTROPHILS NFR BLD AUTO: 51 %
PLATELET # BLD AUTO: 342 K/UL
RBC # BLD: 4.67 M/UL
RBC # FLD: 13.2 %
TIBC SERPL-MCNC: 302 UG/DL
UIBC SERPL-MCNC: 181 UG/DL
VIT B12 SERPL-MCNC: 1390 PG/ML
WBC # FLD AUTO: 6.54 K/UL

## 2023-04-14 ENCOUNTER — RX RENEWAL (OUTPATIENT)
Age: 78
End: 2023-04-14

## 2023-05-02 DIAGNOSIS — R41.3 OTHER AMNESIA: ICD-10-CM

## 2023-05-04 NOTE — HISTORY OF PRESENT ILLNESS
[Spouse] : spouse [FreeTextEntry1] : Follow-up dementia [de-identified] : 78-year-old female for follow-up dementia.  Patient  reports that patient is doing physically okay.  Able to ambulate on her own.  Eating/drinking/voiding/BM within normal limits.  Short-term memory loss continues.\par \par Elevated B12/ferritin levels.  Due for repeat blood work.

## 2023-05-04 NOTE — PLAN
[FreeTextEntry1] : 78-year-old female for follow-up.\par \par Dementia.  Supportive therapy discussed. Pt does require help w/ grooming and other ADL's. Her  accompanies her to all appointments. Does not go anywhere alone. Home care discussed. Pt  agreeable.\par \par Elevated ferritin/B12.  Labs drawn.\par \par \par Signs and symptoms warranting further eval advised.  All questions answered.  Patient and  voiced understanding and in agreement to plan.  Return to clinic as recommended.

## 2023-05-04 NOTE — PHYSICAL EXAM
[No Acute Distress] : no acute distress [Well Nourished] : well nourished [Well Developed] : well developed [Well-Appearing] : well-appearing [Normal Voice/Communication] : normal voice/communication [Normal Sclera/Conjunctiva] : normal sclera/conjunctiva [No Respiratory Distress] : no respiratory distress  [Clear to Auscultation] : lungs were clear to auscultation bilaterally [Normal Rate] : normal rate  [Normal S1, S2] : normal S1 and S2 [No Edema] : there was no peripheral edema [Soft] : abdomen soft [Non Tender] : non-tender [Normal Bowel Sounds] : normal bowel sounds [Speech Grossly Normal] : speech grossly normal [Normal Mood] : the mood was normal [de-identified] : Alert. At baseline.  [de-identified] : Patient is not oriented to time.

## 2023-08-01 ENCOUNTER — APPOINTMENT (OUTPATIENT)
Dept: FAMILY MEDICINE | Facility: CLINIC | Age: 78
End: 2023-08-01
Payer: MEDICARE

## 2023-08-01 VITALS
SYSTOLIC BLOOD PRESSURE: 126 MMHG | OXYGEN SATURATION: 97 % | BODY MASS INDEX: 20.49 KG/M2 | WEIGHT: 120 LBS | HEIGHT: 64 IN | HEART RATE: 62 BPM | DIASTOLIC BLOOD PRESSURE: 74 MMHG | TEMPERATURE: 98.1 F

## 2023-08-01 DIAGNOSIS — E78.5 HYPERLIPIDEMIA, UNSPECIFIED: ICD-10-CM

## 2023-08-01 DIAGNOSIS — R79.89 OTHER SPECIFIED ABNORMAL FINDINGS OF BLOOD CHEMISTRY: ICD-10-CM

## 2023-08-01 DIAGNOSIS — R74.8 ABNORMAL LEVELS OF OTHER SERUM ENZYMES: ICD-10-CM

## 2023-08-01 PROCEDURE — 36415 COLL VENOUS BLD VENIPUNCTURE: CPT

## 2023-08-01 PROCEDURE — 99213 OFFICE O/P EST LOW 20 MIN: CPT | Mod: 25

## 2023-08-01 NOTE — PHYSICAL EXAM
[No Acute Distress] : no acute distress [Well Nourished] : well nourished [Well Developed] : well developed [Well-Appearing] : well-appearing [Normal Voice/Communication] : normal voice/communication [Normal Sclera/Conjunctiva] : normal sclera/conjunctiva [No Respiratory Distress] : no respiratory distress  [Clear to Auscultation] : lungs were clear to auscultation bilaterally [Normal Rate] : normal rate  [Normal S1, S2] : normal S1 and S2 [No Edema] : there was no peripheral edema [Soft] : abdomen soft [Non Tender] : non-tender [Normal Bowel Sounds] : normal bowel sounds [Speech Grossly Normal] : speech grossly normal [Normal Mood] : the mood was normal [de-identified] : Alert. At baseline.  [de-identified] : Patient is not oriented to time.

## 2023-08-01 NOTE — PLAN
[FreeTextEntry1] : 78-year-old female for follow-up.  Dementia. Supportive therapy discussed. Pt does require help w/ grooming and other ADL's. Her  accompanies her to all appointments. Does not go anywhere alone. Facility such as assisted living discussed but pt  does not want this.  Elevated ferritin/B12. Labs drawn.  Signs and symptoms warranting further eval advised. All questions answered. Patient and  voiced understanding and in agreement to plan. Return to clinic as recommended.

## 2023-08-01 NOTE — HISTORY OF PRESENT ILLNESS
[FreeTextEntry1] : Follow-up dementia. Patient accompanied by spouse. [de-identified] : 78-year-old female for follow-up dementia. Patient  reports that patient is doing physically okay but unable to be left attended. Able to ambulate on her own. Eating/drinking/voiding/BM within normal limits. Short-term memory loss continues. Cannot recall if she had breakfast or not. Pt has not been approved for home care services through [employer] or [employer]. Pt  does not want private hire.   Elevated B12/ferritin levels. Due for repeat blood work.

## 2023-08-02 LAB
25(OH)D3 SERPL-MCNC: 45.9 NG/ML
ALBUMIN SERPL ELPH-MCNC: 4.6 G/DL
ALP BLD-CCNC: 58 U/L
ALT SERPL-CCNC: 22 U/L
ANION GAP SERPL CALC-SCNC: 12 MMOL/L
AST SERPL-CCNC: 27 U/L
BILIRUB SERPL-MCNC: 0.8 MG/DL
BUN SERPL-MCNC: 12 MG/DL
CALCIUM SERPL-MCNC: 9.9 MG/DL
CHLORIDE SERPL-SCNC: 103 MMOL/L
CHOLEST SERPL-MCNC: 195 MG/DL
CO2 SERPL-SCNC: 24 MMOL/L
CREAT SERPL-MCNC: 0.57 MG/DL
EGFR: 92 ML/MIN/1.73M2
ESTIMATED AVERAGE GLUCOSE: 108 MG/DL
FERRITIN SERPL-MCNC: 388 NG/ML
FOLATE SERPL-MCNC: >20 NG/ML
GLUCOSE SERPL-MCNC: 89 MG/DL
HBA1C MFR BLD HPLC: 5.4 %
HDLC SERPL-MCNC: 87 MG/DL
IRON SATN MFR SERPL: 49 %
IRON SERPL-MCNC: 136 UG/DL
LDLC SERPL CALC-MCNC: 93 MG/DL
NONHDLC SERPL-MCNC: 108 MG/DL
POTASSIUM SERPL-SCNC: 4.4 MMOL/L
PROT SERPL-MCNC: 6.8 G/DL
SODIUM SERPL-SCNC: 138 MMOL/L
TIBC SERPL-MCNC: 276 UG/DL
TRIGL SERPL-MCNC: 89 MG/DL
TSH SERPL-ACNC: 2.55 UIU/ML
UIBC SERPL-MCNC: 140 UG/DL
VIT B12 SERPL-MCNC: 1361 PG/ML

## 2023-09-29 ENCOUNTER — RX RENEWAL (OUTPATIENT)
Age: 78
End: 2023-09-29

## 2023-09-29 RX ORDER — PANTOPRAZOLE 40 MG/1
40 TABLET, DELAYED RELEASE ORAL
Qty: 90 | Refills: 3 | Status: ACTIVE | COMMUNITY
Start: 2020-05-11 | End: 1900-01-01

## 2023-12-15 DIAGNOSIS — R45.1 RESTLESSNESS AND AGITATION: ICD-10-CM

## 2024-02-06 ENCOUNTER — APPOINTMENT (OUTPATIENT)
Dept: FAMILY MEDICINE | Facility: CLINIC | Age: 79
End: 2024-02-06

## 2024-03-12 ENCOUNTER — INPATIENT (INPATIENT)
Facility: HOSPITAL | Age: 79
LOS: 5 days | Discharge: HOME HEALTH | DRG: 535 | End: 2024-03-18
Attending: HOSPITALIST | Admitting: STUDENT IN AN ORGANIZED HEALTH CARE EDUCATION/TRAINING PROGRAM
Payer: MEDICARE

## 2024-03-12 VITALS
RESPIRATION RATE: 18 BRPM | HEART RATE: 63 BPM | DIASTOLIC BLOOD PRESSURE: 70 MMHG | TEMPERATURE: 98 F | OXYGEN SATURATION: 98 % | SYSTOLIC BLOOD PRESSURE: 179 MMHG

## 2024-03-12 LAB
ALBUMIN SERPL ELPH-MCNC: 3.8 G/DL — SIGNIFICANT CHANGE UP (ref 3.3–5)
ALP SERPL-CCNC: 56 U/L — SIGNIFICANT CHANGE UP (ref 40–120)
ALT FLD-CCNC: 28 U/L — SIGNIFICANT CHANGE UP (ref 12–78)
ANION GAP SERPL CALC-SCNC: 5 MMOL/L — SIGNIFICANT CHANGE UP (ref 5–17)
APPEARANCE UR: ABNORMAL
APTT BLD: 29.9 SEC — SIGNIFICANT CHANGE UP (ref 24.5–35.6)
AST SERPL-CCNC: 26 U/L — SIGNIFICANT CHANGE UP (ref 15–37)
BACTERIA # UR AUTO: ABNORMAL /HPF
BASOPHILS # BLD AUTO: 0.11 K/UL — SIGNIFICANT CHANGE UP (ref 0–0.2)
BASOPHILS NFR BLD AUTO: 1 % — SIGNIFICANT CHANGE UP (ref 0–2)
BILIRUB SERPL-MCNC: 0.6 MG/DL — SIGNIFICANT CHANGE UP (ref 0.2–1.2)
BILIRUB UR-MCNC: NEGATIVE — SIGNIFICANT CHANGE UP
BLD GP AB SCN SERPL QL: SIGNIFICANT CHANGE UP
BUN SERPL-MCNC: 23 MG/DL — SIGNIFICANT CHANGE UP (ref 7–23)
CALCIUM SERPL-MCNC: 10.2 MG/DL — HIGH (ref 8.5–10.1)
CAST: 0 /LPF — SIGNIFICANT CHANGE UP (ref 0–4)
CHLORIDE SERPL-SCNC: 111 MMOL/L — HIGH (ref 96–108)
CO2 SERPL-SCNC: 26 MMOL/L — SIGNIFICANT CHANGE UP (ref 22–31)
COLOR SPEC: YELLOW — SIGNIFICANT CHANGE UP
CREAT SERPL-MCNC: 0.77 MG/DL — SIGNIFICANT CHANGE UP (ref 0.5–1.3)
DIFF PNL FLD: NEGATIVE — SIGNIFICANT CHANGE UP
EGFR: 77 ML/MIN/1.73M2 — SIGNIFICANT CHANGE UP
EOSINOPHIL # BLD AUTO: 0.35 K/UL — SIGNIFICANT CHANGE UP (ref 0–0.5)
EOSINOPHIL NFR BLD AUTO: 3.1 % — SIGNIFICANT CHANGE UP (ref 0–6)
GLUCOSE SERPL-MCNC: 95 MG/DL — SIGNIFICANT CHANGE UP (ref 70–99)
GLUCOSE UR QL: NEGATIVE MG/DL — SIGNIFICANT CHANGE UP
HCT VFR BLD CALC: 40.2 % — SIGNIFICANT CHANGE UP (ref 34.5–45)
HGB BLD-MCNC: 13.7 G/DL — SIGNIFICANT CHANGE UP (ref 11.5–15.5)
IMM GRANULOCYTES NFR BLD AUTO: 0.4 % — SIGNIFICANT CHANGE UP (ref 0–0.9)
INR BLD: 1.03 RATIO — SIGNIFICANT CHANGE UP (ref 0.85–1.18)
KETONES UR-MCNC: NEGATIVE MG/DL — SIGNIFICANT CHANGE UP
LEUKOCYTE ESTERASE UR-ACNC: ABNORMAL
LYMPHOCYTES # BLD AUTO: 1.79 K/UL — SIGNIFICANT CHANGE UP (ref 1–3.3)
LYMPHOCYTES # BLD AUTO: 16 % — SIGNIFICANT CHANGE UP (ref 13–44)
MCHC RBC-ENTMCNC: 30.8 PG — SIGNIFICANT CHANGE UP (ref 27–34)
MCHC RBC-ENTMCNC: 34.1 GM/DL — SIGNIFICANT CHANGE UP (ref 32–36)
MCV RBC AUTO: 90.3 FL — SIGNIFICANT CHANGE UP (ref 80–100)
MONOCYTES # BLD AUTO: 1.09 K/UL — HIGH (ref 0–0.9)
MONOCYTES NFR BLD AUTO: 9.7 % — SIGNIFICANT CHANGE UP (ref 2–14)
NEUTROPHILS # BLD AUTO: 7.81 K/UL — HIGH (ref 1.8–7.4)
NEUTROPHILS NFR BLD AUTO: 69.8 % — SIGNIFICANT CHANGE UP (ref 43–77)
NITRITE UR-MCNC: POSITIVE
PH UR: 8 — SIGNIFICANT CHANGE UP (ref 5–8)
PLATELET # BLD AUTO: 281 K/UL — SIGNIFICANT CHANGE UP (ref 150–400)
POTASSIUM SERPL-MCNC: 3.7 MMOL/L — SIGNIFICANT CHANGE UP (ref 3.5–5.3)
POTASSIUM SERPL-SCNC: 3.7 MMOL/L — SIGNIFICANT CHANGE UP (ref 3.5–5.3)
PROT SERPL-MCNC: 7.1 GM/DL — SIGNIFICANT CHANGE UP (ref 6–8.3)
PROT UR-MCNC: NEGATIVE MG/DL — SIGNIFICANT CHANGE UP
PROTHROM AB SERPL-ACNC: 11.6 SEC — SIGNIFICANT CHANGE UP (ref 9.5–13)
RBC # BLD: 4.45 M/UL — SIGNIFICANT CHANGE UP (ref 3.8–5.2)
RBC # FLD: 13 % — SIGNIFICANT CHANGE UP (ref 10.3–14.5)
RBC CASTS # UR COMP ASSIST: 2 /HPF — SIGNIFICANT CHANGE UP (ref 0–4)
SODIUM SERPL-SCNC: 142 MMOL/L — SIGNIFICANT CHANGE UP (ref 135–145)
SP GR SPEC: 1.01 — SIGNIFICANT CHANGE UP (ref 1–1.03)
SQUAMOUS # UR AUTO: 0 /HPF — SIGNIFICANT CHANGE UP (ref 0–5)
UROBILINOGEN FLD QL: 1 MG/DL — SIGNIFICANT CHANGE UP (ref 0.2–1)
WBC # BLD: 11.2 K/UL — HIGH (ref 3.8–10.5)
WBC # FLD AUTO: 11.2 K/UL — HIGH (ref 3.8–10.5)
WBC UR QL: 90 /HPF — HIGH (ref 0–5)

## 2024-03-12 PROCEDURE — 73502 X-RAY EXAM HIP UNI 2-3 VIEWS: CPT | Mod: 26,RT

## 2024-03-12 PROCEDURE — 71045 X-RAY EXAM CHEST 1 VIEW: CPT | Mod: 26

## 2024-03-12 PROCEDURE — 76376 3D RENDER W/INTRP POSTPROCES: CPT | Mod: 26

## 2024-03-12 PROCEDURE — 73552 X-RAY EXAM OF FEMUR 2/>: CPT | Mod: 26,RT

## 2024-03-12 PROCEDURE — 70450 CT HEAD/BRAIN W/O DYE: CPT | Mod: 26,MC

## 2024-03-12 PROCEDURE — 99285 EMERGENCY DEPT VISIT HI MDM: CPT

## 2024-03-12 PROCEDURE — 72192 CT PELVIS W/O DYE: CPT | Mod: 26,MC

## 2024-03-12 RX ORDER — MORPHINE SULFATE 50 MG/1
2 CAPSULE, EXTENDED RELEASE ORAL ONCE
Refills: 0 | Status: DISCONTINUED | OUTPATIENT
Start: 2024-03-12 | End: 2024-03-13

## 2024-03-12 RX ORDER — SODIUM CHLORIDE 9 MG/ML
500 INJECTION INTRAMUSCULAR; INTRAVENOUS; SUBCUTANEOUS ONCE
Refills: 0 | Status: COMPLETED | OUTPATIENT
Start: 2024-03-12 | End: 2024-03-12

## 2024-03-12 RX ADMIN — SODIUM CHLORIDE 500 MILLILITER(S): 9 INJECTION INTRAMUSCULAR; INTRAVENOUS; SUBCUTANEOUS at 22:35

## 2024-03-12 NOTE — ED PROVIDER NOTE - NEUROLOGICAL, MLM
Alert and oriented, no focal deficits, no motor or sensory deficits. Alert, no focal deficits, no motor or sensory deficits.  CNs intact, normal speech when pt talks

## 2024-03-12 NOTE — ED PROVIDER NOTE - CARE PLAN
Principal Discharge DX:	Acute UTI (urinary tract infection)  Secondary Diagnosis:	Unable to walk  Secondary Diagnosis:	Injury of right hip, initial encounter  Secondary Diagnosis:	Fall from standing, initial encounter  Secondary Diagnosis:	Dementia   1

## 2024-03-12 NOTE — ED PROVIDER NOTE - OBJECTIVE STATEMENT
80 y/o female with PMHx of dementia, HLD, PUD BIBA from home s/p trip and fall in garage approximately 4PM c/o pain to right hip/groin. History via  as patient unable to give coherent history due to dementia.  witnessed episode, denies patient hitting head or LOC. Patient was unable to get up after fall due to pain. Patient  denies N/V, headache, RLE distal weak/numb. Patient unable to describe pain quality nor severity. Denies back/neck pain.  states patient can get agitated. 78 y/o female with PMHx of dementia, HLD, PUD, BIBA from home s/p trip and fall in garage approximately 4PM c/o pain to right hip/groin. History via  as patient unable to give coherent history due to dementia.  witnessed episode, denies patient hitting head or LOC. Patient was unable to get up after fall due to pain. Patient  denies N/V, headache, RLE distal weak/numb. Patient unable to describe pain quality nor severity. Denies back/neck pain.  states patient can get agitated, has had prior episodes of "sundowning".

## 2024-03-12 NOTE — ED ADULT TRIAGE NOTE - CHIEF COMPLAINT QUOTE
pt presents to the ED for "trip" resulting in R hip pain and injury. as per pt and EMS pt was not found on the floor but was injured by tripping over the rug and falling onto another object. no head strike. no blood thinners reported. pt A&Ox2 - confused at baseline, well appearing, and ambulatory at baseline with no further complaints or discomforts reported at this time.

## 2024-03-12 NOTE — ED ADULT NURSE NOTE - OBJECTIVE STATEMENT
pt arrival to ed s/p witnessed fall. pt  reports pt tripped and fell onto L hip. pt denies LOC. - AC. pt is disoriented and pt  reports baseline dementia with sundowning increasing over the past 6 months. pt very confused but has no complaints at this time. safety and comfort measures maintained.  at bedside.

## 2024-03-12 NOTE — ED ADULT NURSE NOTE - NSFALLRISKINTERV_ED_ALL_ED

## 2024-03-12 NOTE — ED PROVIDER NOTE - CLINICAL SUMMARY MEDICAL DECISION MAKING FREE TEXT BOX
78 y/o female with PMHx of dementia, HLD, PUD BIBA from home s/p trip and fall in garage approximately 4PM c/o pain to right hip/groin. Patient unable to right SLR + [place] right hip. Clinical concern for right hip fracture.     Plan CT head, xrays chest, pelvis, right hip, femur, EKG, labs, cautious IV fluid, PRN morphine for pain, fall precautions, observe, reassess. 78 y/o female with PMHx of dementia, HLD, PUD BIBA from home s/p trip and fall in garage approximately 4PM c/o pain to right hip/groin. Patient unable to right SLR + [place] right hip. Clinical concern for right hip fracture.     Plan CT head, xrays chest, pelvis, right hip, femur, EKG, labs, cautious IV fluid, PRN morphine for pain, fall precautions, observe, reassess.    00:50, CLINTON Ceballos MD:  Ct head report negative.  Labs notable for elev. WBC 11 with U/A + UTI.  BCs/lactate & IV Ceftriaxone ordered.  XRs wet read: no obvious fx/dislocation.  CT bony pelvis done, report pending.  Case d/w Dr. OLIVIER Garcia & accepted for Med admit to Ortho floor.  Ortho resident ware of consult. 78 y/o female with PMHx of dementia, HLD, PUD BIBA from home s/p trip and fall in garage approximately 4PM c/o pain to right hip/groin. Patient unable to right SLR, + tender right hip. Clinical concern for right hip fracture.     Plan CT head, xrays: chest, pelvis, right hip, femur, EKG, labs, cautious IV fluid, PRN morphine for pain, fall precautions, observe, reassess.    00:50, CLINTON Ceballos MD:  CT head report negative.  Labs notable for elev. WBC 11 with U/A + UTI.  BCs/lactate & IV Ceftriaxone ordered.  XRs wet read: no obvious fx/dislocation.  CT bony pelvis done, report pending.  Case d/w Dr. OLIVIER Garcia & accepted for Med admit to Ortho floor.  Ortho resident aware of ED consult.

## 2024-03-12 NOTE — ED PROVIDER NOTE - EYES, MLM
Clear bilaterally, pupils equal, round and reactive to light, EOMI, no raccoons Clear bilaterally, pupils equal, round and reactive to light, EOMI, no raccoon's

## 2024-03-12 NOTE — ED PROVIDER NOTE - MUSCULOSKELETAL, MLM
neck nontender supple without pain. Back, cw, pelvis nontender and stable. Patient unable to right SLR due to hip/groin pain, + tender right hip without gross swelling or deformity. RLE distal motor sensory intact, patient moves other 3 extremities without obvious pain Neck nontender supple without pain. Back, chest wall, pelvis nontender and stable. Patient unable to right SLR due to hip/groin pain, + tender right hip without gross swelling or deformity. RLE distal motor sensory intact. Patient moves other 3 extremities without obvious pain

## 2024-03-12 NOTE — ED PROVIDER NOTE - ENMT, MLM
Airway patent, Nasal mucosa clear. Mouth with normal mucosa. Throat has no vesicles, no oropharyngeal exudates and uvula is midline. NC/AT, no battles Airway patent, Nasal mucosa clear. Mouth with normal mucosa. Throat has no vesicles, no oropharyngeal exudates and uvula is midline. NC/AT, no [place]'s

## 2024-03-12 NOTE — ED ADULT NURSE NOTE - NS ED NURSE LEVEL OF CONSCIOUSNESS AFFECT
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 Please schedule an appointment for follow-up and further discussion regarding her allergy testing results and further allergy and sinus management. Calm

## 2024-03-12 NOTE — ED PROVIDER NOTE - CARDIAC, MLM
Normal rate, regular rhythm, normal radial pulse,  Heart sounds S1, S2.  No murmurs, rubs or gallops. Normal rate, regular rhythm, normal radial pulse,  Heart sounds S1, S2.  No murmurs, rubs or gallops.  Normal radial pulse

## 2024-03-13 DIAGNOSIS — N39.0 URINARY TRACT INFECTION, SITE NOT SPECIFIED: ICD-10-CM

## 2024-03-13 LAB
ADD ON TEST-SPECIMEN IN LAB: SIGNIFICANT CHANGE UP
ALBUMIN SERPL ELPH-MCNC: 3.7 G/DL — SIGNIFICANT CHANGE UP (ref 3.3–5)
ALP SERPL-CCNC: 57 U/L — SIGNIFICANT CHANGE UP (ref 40–120)
ALT FLD-CCNC: 26 U/L — SIGNIFICANT CHANGE UP (ref 12–78)
ANION GAP SERPL CALC-SCNC: 9 MMOL/L — SIGNIFICANT CHANGE UP (ref 5–17)
APTT BLD: 29.4 SEC — SIGNIFICANT CHANGE UP (ref 24.5–35.6)
AST SERPL-CCNC: 24 U/L — SIGNIFICANT CHANGE UP (ref 15–37)
BASOPHILS # BLD AUTO: 0.07 K/UL — SIGNIFICANT CHANGE UP (ref 0–0.2)
BASOPHILS NFR BLD AUTO: 0.6 % — SIGNIFICANT CHANGE UP (ref 0–2)
BILIRUB SERPL-MCNC: 0.8 MG/DL — SIGNIFICANT CHANGE UP (ref 0.2–1.2)
BUN SERPL-MCNC: 17 MG/DL — SIGNIFICANT CHANGE UP (ref 7–23)
CALCIUM SERPL-MCNC: 9.6 MG/DL — SIGNIFICANT CHANGE UP (ref 8.5–10.1)
CHLORIDE SERPL-SCNC: 108 MMOL/L — SIGNIFICANT CHANGE UP (ref 96–108)
CO2 SERPL-SCNC: 20 MMOL/L — LOW (ref 22–31)
CREAT SERPL-MCNC: 0.68 MG/DL — SIGNIFICANT CHANGE UP (ref 0.5–1.3)
EGFR: 87 ML/MIN/1.73M2 — SIGNIFICANT CHANGE UP
EOSINOPHIL # BLD AUTO: 0.11 K/UL — SIGNIFICANT CHANGE UP (ref 0–0.5)
EOSINOPHIL NFR BLD AUTO: 0.9 % — SIGNIFICANT CHANGE UP (ref 0–6)
GLUCOSE SERPL-MCNC: 135 MG/DL — HIGH (ref 70–99)
HCT VFR BLD CALC: 42.4 % — SIGNIFICANT CHANGE UP (ref 34.5–45)
HGB BLD-MCNC: 14.7 G/DL — SIGNIFICANT CHANGE UP (ref 11.5–15.5)
IMM GRANULOCYTES NFR BLD AUTO: 0.4 % — SIGNIFICANT CHANGE UP (ref 0–0.9)
INR BLD: 0.97 RATIO — SIGNIFICANT CHANGE UP (ref 0.85–1.18)
LACTATE SERPL-SCNC: 1.5 MMOL/L — SIGNIFICANT CHANGE UP (ref 0.7–2)
LACTATE SERPL-SCNC: 2.2 MMOL/L — HIGH (ref 0.7–2)
LYMPHOCYTES # BLD AUTO: 1.28 K/UL — SIGNIFICANT CHANGE UP (ref 1–3.3)
LYMPHOCYTES # BLD AUTO: 10.8 % — LOW (ref 13–44)
MAGNESIUM SERPL-MCNC: 1.9 MG/DL — SIGNIFICANT CHANGE UP (ref 1.6–2.6)
MCHC RBC-ENTMCNC: 30.6 PG — SIGNIFICANT CHANGE UP (ref 27–34)
MCHC RBC-ENTMCNC: 34.7 GM/DL — SIGNIFICANT CHANGE UP (ref 32–36)
MCV RBC AUTO: 88.1 FL — SIGNIFICANT CHANGE UP (ref 80–100)
MONOCYTES # BLD AUTO: 1 K/UL — HIGH (ref 0–0.9)
MONOCYTES NFR BLD AUTO: 8.5 % — SIGNIFICANT CHANGE UP (ref 2–14)
NEUTROPHILS # BLD AUTO: 9.31 K/UL — HIGH (ref 1.8–7.4)
NEUTROPHILS NFR BLD AUTO: 78.8 % — HIGH (ref 43–77)
PLATELET # BLD AUTO: 293 K/UL — SIGNIFICANT CHANGE UP (ref 150–400)
POTASSIUM SERPL-MCNC: 2.7 MMOL/L — CRITICAL LOW (ref 3.5–5.3)
POTASSIUM SERPL-SCNC: 2.7 MMOL/L — CRITICAL LOW (ref 3.5–5.3)
PROT SERPL-MCNC: 7.3 GM/DL — SIGNIFICANT CHANGE UP (ref 6–8.3)
PROTHROM AB SERPL-ACNC: 11 SEC — SIGNIFICANT CHANGE UP (ref 9.5–13)
RBC # BLD: 4.81 M/UL — SIGNIFICANT CHANGE UP (ref 3.8–5.2)
RBC # FLD: 13 % — SIGNIFICANT CHANGE UP (ref 10.3–14.5)
SODIUM SERPL-SCNC: 137 MMOL/L — SIGNIFICANT CHANGE UP (ref 135–145)
WBC # BLD: 11.82 K/UL — HIGH (ref 3.8–10.5)
WBC # FLD AUTO: 11.82 K/UL — HIGH (ref 3.8–10.5)

## 2024-03-13 PROCEDURE — 83605 ASSAY OF LACTIC ACID: CPT

## 2024-03-13 PROCEDURE — 87086 URINE CULTURE/COLONY COUNT: CPT

## 2024-03-13 PROCEDURE — 83735 ASSAY OF MAGNESIUM: CPT

## 2024-03-13 PROCEDURE — 97162 PT EVAL MOD COMPLEX 30 MIN: CPT | Mod: GP

## 2024-03-13 PROCEDURE — 99223 1ST HOSP IP/OBS HIGH 75: CPT

## 2024-03-13 PROCEDURE — 97530 THERAPEUTIC ACTIVITIES: CPT | Mod: GP

## 2024-03-13 PROCEDURE — 85027 COMPLETE CBC AUTOMATED: CPT

## 2024-03-13 PROCEDURE — 82746 ASSAY OF FOLIC ACID SERUM: CPT

## 2024-03-13 PROCEDURE — 85730 THROMBOPLASTIN TIME PARTIAL: CPT

## 2024-03-13 PROCEDURE — 80048 BASIC METABOLIC PNL TOTAL CA: CPT

## 2024-03-13 PROCEDURE — 72170 X-RAY EXAM OF PELVIS: CPT

## 2024-03-13 PROCEDURE — 97116 GAIT TRAINING THERAPY: CPT | Mod: GP

## 2024-03-13 PROCEDURE — 93010 ELECTROCARDIOGRAM REPORT: CPT

## 2024-03-13 PROCEDURE — 72170 X-RAY EXAM OF PELVIS: CPT | Mod: 26

## 2024-03-13 PROCEDURE — 82607 VITAMIN B-12: CPT

## 2024-03-13 PROCEDURE — 73700 CT LOWER EXTREMITY W/O DYE: CPT | Mod: MC,RT

## 2024-03-13 PROCEDURE — 36415 COLL VENOUS BLD VENIPUNCTURE: CPT

## 2024-03-13 PROCEDURE — 84443 ASSAY THYROID STIM HORMONE: CPT

## 2024-03-13 PROCEDURE — 85025 COMPLETE CBC W/AUTO DIFF WBC: CPT

## 2024-03-13 PROCEDURE — 87040 BLOOD CULTURE FOR BACTERIA: CPT

## 2024-03-13 PROCEDURE — 93005 ELECTROCARDIOGRAM TRACING: CPT

## 2024-03-13 PROCEDURE — 73700 CT LOWER EXTREMITY W/O DYE: CPT | Mod: 26,RT

## 2024-03-13 PROCEDURE — 80053 COMPREHEN METABOLIC PANEL: CPT

## 2024-03-13 PROCEDURE — 76376 3D RENDER W/INTRP POSTPROCES: CPT | Mod: 26

## 2024-03-13 PROCEDURE — 85610 PROTHROMBIN TIME: CPT

## 2024-03-13 PROCEDURE — 76376 3D RENDER W/INTRP POSTPROCES: CPT

## 2024-03-13 RX ORDER — DULOXETINE HYDROCHLORIDE 30 MG/1
1 CAPSULE, DELAYED RELEASE ORAL
Refills: 0 | DISCHARGE

## 2024-03-13 RX ORDER — MORPHINE SULFATE 50 MG/1
2 CAPSULE, EXTENDED RELEASE ORAL ONCE
Refills: 0 | Status: DISCONTINUED | OUTPATIENT
Start: 2024-03-13 | End: 2024-03-13

## 2024-03-13 RX ORDER — DEXTROSE MONOHYDRATE, SODIUM CHLORIDE, AND POTASSIUM CHLORIDE 50; .745; 4.5 G/1000ML; G/1000ML; G/1000ML
1000 INJECTION, SOLUTION INTRAVENOUS
Refills: 0 | Status: DISCONTINUED | OUTPATIENT
Start: 2024-03-13 | End: 2024-03-14

## 2024-03-13 RX ORDER — CEFTRIAXONE 500 MG/1
1000 INJECTION, POWDER, FOR SOLUTION INTRAMUSCULAR; INTRAVENOUS ONCE
Refills: 0 | Status: COMPLETED | OUTPATIENT
Start: 2024-03-13 | End: 2024-03-13

## 2024-03-13 RX ORDER — ACETAMINOPHEN 500 MG
650 TABLET ORAL EVERY 6 HOURS
Refills: 0 | Status: DISCONTINUED | OUTPATIENT
Start: 2024-03-13 | End: 2024-03-18

## 2024-03-13 RX ORDER — ONDANSETRON 8 MG/1
4 TABLET, FILM COATED ORAL ONCE
Refills: 0 | Status: DISCONTINUED | OUTPATIENT
Start: 2024-03-13 | End: 2024-03-18

## 2024-03-13 RX ORDER — HYDRALAZINE HCL 50 MG
10 TABLET ORAL EVERY 6 HOURS
Refills: 0 | Status: DISCONTINUED | OUTPATIENT
Start: 2024-03-13 | End: 2024-03-18

## 2024-03-13 RX ORDER — HEPARIN SODIUM 5000 [USP'U]/ML
5000 INJECTION INTRAVENOUS; SUBCUTANEOUS EVERY 12 HOURS
Refills: 0 | Status: DISCONTINUED | OUTPATIENT
Start: 2024-03-13 | End: 2024-03-18

## 2024-03-13 RX ORDER — HALOPERIDOL DECANOATE 100 MG/ML
3 INJECTION INTRAMUSCULAR ONCE
Refills: 0 | Status: COMPLETED | OUTPATIENT
Start: 2024-03-13 | End: 2024-03-13

## 2024-03-13 RX ORDER — POTASSIUM CHLORIDE 20 MEQ
10 PACKET (EA) ORAL
Refills: 0 | Status: COMPLETED | OUTPATIENT
Start: 2024-03-13 | End: 2024-03-13

## 2024-03-13 RX ORDER — ATORVASTATIN CALCIUM 80 MG/1
40 TABLET, FILM COATED ORAL AT BEDTIME
Refills: 0 | Status: DISCONTINUED | OUTPATIENT
Start: 2024-03-13 | End: 2024-03-18

## 2024-03-13 RX ORDER — PANTOPRAZOLE SODIUM 20 MG/1
40 TABLET, DELAYED RELEASE ORAL
Refills: 0 | Status: DISCONTINUED | OUTPATIENT
Start: 2024-03-13 | End: 2024-03-18

## 2024-03-13 RX ORDER — DULOXETINE HYDROCHLORIDE 30 MG/1
30 CAPSULE, DELAYED RELEASE ORAL DAILY
Refills: 0 | Status: DISCONTINUED | OUTPATIENT
Start: 2024-03-13 | End: 2024-03-18

## 2024-03-13 RX ORDER — POTASSIUM CHLORIDE 20 MEQ
20 PACKET (EA) ORAL ONCE
Refills: 0 | Status: COMPLETED | OUTPATIENT
Start: 2024-03-13 | End: 2024-03-13

## 2024-03-13 RX ORDER — QUETIAPINE FUMARATE 200 MG/1
25 TABLET, FILM COATED ORAL AT BEDTIME
Refills: 0 | Status: DISCONTINUED | OUTPATIENT
Start: 2024-03-13 | End: 2024-03-16

## 2024-03-13 RX ORDER — CEFTRIAXONE 500 MG/1
1000 INJECTION, POWDER, FOR SOLUTION INTRAMUSCULAR; INTRAVENOUS EVERY 24 HOURS
Refills: 0 | Status: COMPLETED | OUTPATIENT
Start: 2024-03-13 | End: 2024-03-15

## 2024-03-13 RX ORDER — OXYCODONE HYDROCHLORIDE 5 MG/1
5 TABLET ORAL EVERY 4 HOURS
Refills: 0 | Status: DISCONTINUED | OUTPATIENT
Start: 2024-03-13 | End: 2024-03-18

## 2024-03-13 RX ORDER — MAGNESIUM SULFATE 500 MG/ML
1 VIAL (ML) INJECTION ONCE
Refills: 0 | Status: COMPLETED | OUTPATIENT
Start: 2024-03-13 | End: 2024-03-13

## 2024-03-13 RX ORDER — PANTOPRAZOLE SODIUM 20 MG/1
1 TABLET, DELAYED RELEASE ORAL
Refills: 0 | DISCHARGE

## 2024-03-13 RX ADMIN — DEXTROSE MONOHYDRATE, SODIUM CHLORIDE, AND POTASSIUM CHLORIDE 75 MILLILITER(S): 50; .745; 4.5 INJECTION, SOLUTION INTRAVENOUS at 22:27

## 2024-03-13 RX ADMIN — Medication 100 MILLIEQUIVALENT(S): at 10:49

## 2024-03-13 RX ADMIN — Medication 100 GRAM(S): at 22:27

## 2024-03-13 RX ADMIN — CEFTRIAXONE 1000 MILLIGRAM(S): 500 INJECTION, POWDER, FOR SOLUTION INTRAMUSCULAR; INTRAVENOUS at 01:48

## 2024-03-13 RX ADMIN — Medication 20 MILLIEQUIVALENT(S): at 22:26

## 2024-03-13 RX ADMIN — Medication 650 MILLIGRAM(S): at 15:38

## 2024-03-13 RX ADMIN — Medication 100 MILLIEQUIVALENT(S): at 09:10

## 2024-03-13 RX ADMIN — Medication 650 MILLIGRAM(S): at 14:43

## 2024-03-13 RX ADMIN — HALOPERIDOL DECANOATE 3 MILLIGRAM(S): 100 INJECTION INTRAMUSCULAR at 01:43

## 2024-03-13 RX ADMIN — Medication 10 MILLIGRAM(S): at 13:37

## 2024-03-13 RX ADMIN — ATORVASTATIN CALCIUM 40 MILLIGRAM(S): 80 TABLET, FILM COATED ORAL at 22:27

## 2024-03-13 RX ADMIN — QUETIAPINE FUMARATE 25 MILLIGRAM(S): 200 TABLET, FILM COATED ORAL at 22:28

## 2024-03-13 RX ADMIN — HEPARIN SODIUM 5000 UNIT(S): 5000 INJECTION INTRAVENOUS; SUBCUTANEOUS at 09:10

## 2024-03-13 RX ADMIN — HEPARIN SODIUM 5000 UNIT(S): 5000 INJECTION INTRAVENOUS; SUBCUTANEOUS at 22:27

## 2024-03-13 RX ADMIN — MORPHINE SULFATE 2 MILLIGRAM(S): 50 CAPSULE, EXTENDED RELEASE ORAL at 13:01

## 2024-03-13 RX ADMIN — Medication 100 MILLIEQUIVALENT(S): at 11:56

## 2024-03-13 RX ADMIN — MORPHINE SULFATE 2 MILLIGRAM(S): 50 CAPSULE, EXTENDED RELEASE ORAL at 12:46

## 2024-03-13 RX ADMIN — CEFTRIAXONE 1000 MILLIGRAM(S): 500 INJECTION, POWDER, FOR SOLUTION INTRAMUSCULAR; INTRAVENOUS at 22:26

## 2024-03-13 NOTE — DIETITIAN INITIAL EVALUATION ADULT - OTHER INFO
78 y/o F w/ PMH of dementia, dyslipidemia, PUD, p/w mechanical fall. Patient unable to provide history 2/2 dementia. As per chart, patient's  had reported that patient tripped and fell in the garage and c/o pain in R hip / groin area.  witnessed episode, and denies LOC / hitting head. Patient unable to get up after fall 2/2 pain. Patient denies any pain at this time, and states she's comfortable   Admitted for R superior pubic ramus fracture    Prescribed a soft and bite sized diet s/p SLP evaluation with addt of Ensure + HP shake TID.  Son present upon RD visit and asked the Pt not be disturbed as she was sleeping.  RD attempted to obtain information from son who was unable to provide a UBW or weight history.  RD attempted to obtain bed scale weight but bed scale not working. No NFPE conducted as Pt was not to be disturbed but visual inspection shows patient appears thin and frail with possible muscle/fat wasting.  Recommend to c/w diet as prescribed with addition of Ensure + HP shake TID to optimize nutritional needs (provides 350 kcal, 20g protein/ shake).  See recommendations below.

## 2024-03-13 NOTE — SWALLOW BEDSIDE ASSESSMENT ADULT - NS SPL SWALLOW CLINIC TRIAL FT
Rx; sioft and bite size and thin liquids. meds crushed in pudding/puree.   Disc with NSg  Service will follow

## 2024-03-13 NOTE — DIETITIAN INITIAL EVALUATION ADULT - ADD RECOMMEND
1) C/w diet as prescribed. Encourage protein-rich foods, maximize food preferences   2) Monitor bowel movements, if no BM for >3 days, consider implementing bowel regimen.   3) Obtain weekly wt to track/trend changes   4) MVI w/ minerals daily to ensure 100% RDA met   5) Confirm goals of care regarding nutrition support   RD will continue to monitor PO intake, labs, hydration, and wt prn.

## 2024-03-13 NOTE — H&P ADULT - HISTORY OF PRESENT ILLNESS
78 y/o F w/ PMH of dementia, dyslipidemia, PUD, p/w mechanical fall. Patient unable to provide history 2/2 dementia. As per chart, patient's  had reported that patient tripped and fell in the garage and c/o pain in R hip / groin area.  witnessed episode, and denies LOC / hitting head. Patient unable to get up after fall 2/2 pain. Patient denies any pain at this time, and states she's comfortable     PSH / Social Hx / Family Hx: Patient unable to provide 2/2 dementia

## 2024-03-13 NOTE — PHYSICAL THERAPY INITIAL EVALUATION ADULT - PERTINENT HX OF CURRENT PROBLEM, REHAB EVAL
79y Female presents with right hip pain s/p MF. Interview assisted by  and son at bedside due to baseline dementia. +R superior pubic ramus, WBAT.    CT PELVIS: Acute nondisplaced fracture of the right superior pubic ramus at the pubic symphyseal junction. No evidence of acute fracture or dislocation of the right femur. Unchanged appearance of fixation hardware within the right femur.  CT HEAD: No evidence of acute intracranial abnormality.

## 2024-03-13 NOTE — SWALLOW BEDSIDE ASSESSMENT ADULT - COMMENTS
As per H and P: 80 y/o F w/ PMH of dementia, dyslipidemia, PUD, p/w mechanical fall. Patient unable to provide history 2/2 dementia. As per chart, patient's  had reported that patient tripped and fell in the garage and c/o pain in R hip / groin area.  witnessed episode, and denies LOC / hitting head. Patient unable to get up after fall 2/2 pain. Patient denies any pain at this time, and states she's comfortable ..  Service is consulted for po intake., and to determine the texture of the diet.

## 2024-03-13 NOTE — PHYSICAL THERAPY INITIAL EVALUATION ADULT - ACTIVE RANGE OF MOTION EXAMINATION, REHAB EVAL
RLE limited 2/2 weakness possible discomfort/bilateral upper extremity Active ROM was WFL (within functional limits)/Left LE Active ROM was WFL (within functional limits)

## 2024-03-13 NOTE — SWALLOW BEDSIDE ASSESSMENT ADULT - ORAL PHASE
disco-ordiated mastication with open mouth action, but otherwise wfl for AP transfer of liquid and puree

## 2024-03-13 NOTE — H&P ADULT - ASSESSMENT
78 y/o F w/ PMH of dementia, dyslipidemia, PUD, p/w mechanical fall    *R superior pubic ramus fracture s/p mechanical fall  -Pain control  -Patient evaluated by ortho at bedside and recommends: WBAT, PT, ice PRN    *UTI  -Ceftriaxone  -F/u urine cx   -Mild lactic acidosis -> Recheck lactate s/p IVF     *H/o dementia / dyslipidemia / PUD   -C/w home meds and f/u outpatient for further management     *DVT ppx   -Heparin SubQ    >75 mins required for admission

## 2024-03-13 NOTE — CONSULT NOTE ADULT - SUBJECTIVE AND OBJECTIVE BOX
79y Female presents with right hip pain s/p MF. Interview assisted by  and son at bedside due to baseline dementia.  witnessed the fall and states pt was able to ambulate after the fall but her gait was altered so they brought her to ED for evaluation. Denies head strike/LOC/other orthopedic injuries at this time. Patient is a home ambulator without assist at baseline. Pt had R femur IMN for a femoral shaft fx 25 years ago and fractured her distal interlocking screw in 2016 after a fall during a race. Denies AC use.    PAST MEDICAL & SURGICAL HISTORY:  No pertinent past medical history        Home Medications:    Allergies    iodine (Unknown)  penicillins (Unknown)    Intolerances                              13.7   11.20 )-----------( 281      ( 12 Mar 2024 21:20 )             40.2     03-12    142  |  111<H>  |  23  ----------------------------<  95  3.7   |  26  |  0.77    Ca    10.2<H>      12 Mar 2024 21:20    TPro  7.1  /  Alb  3.8  /  TBili  0.6  /  DBili  x   /  AST  26  /  ALT  28  /  AlkPhos  56  03-12    PT/INR - ( 12 Mar 2024 21:20 )   PT: 11.6 sec;   INR: 1.03 ratio         PTT - ( 12 Mar 2024 21:20 )  PTT:29.9 sec  Urinalysis Basic - ( 12 Mar 2024 23:36 )    Color: Yellow / Appearance: Cloudy / S.015 / pH: x  Gluc: x / Ketone: Negative mg/dL  / Bili: Negative / Urobili: 1.0 mg/dL   Blood: x / Protein: Negative mg/dL / Nitrite: Positive   Leuk Esterase: Moderate / RBC: 2 /HPF / WBC 90 /HPF   Sq Epi: x / Non Sq Epi: 0 /HPF / Bacteria: Too Numerous to count /HPF          Vital Signs Last 24 Hrs  T(C): 36.4 (12 Mar 2024 18:10), Max: 36.4 (12 Mar 2024 18:10)  T(F): 97.5 (12 Mar 2024 18:10), Max: 97.5 (12 Mar 2024 18:10)  HR: 63 (12 Mar 2024 18:10) (63 - 63)  BP: 179/70 (12 Mar 2024 18:10) (179/70 - 179/70)  BP(mean): --  RR: 18 (12 Mar 2024 18:10) (18 - 18)  SpO2: 98% (12 Mar 2024 18:10) (98% - 98%)    Parameters below as of 12 Mar 2024 18:10  Patient On (Oxygen Delivery Method): room air        PHYSICAL EXAM  General: NAD, Awake and Alert, follows commands  skin intact, incision CDI  NTTP around the hip or along the femur  able to SLR  no pain with axial load or log roll  SILT alyssa/saph/sp/dp/tib  +Q/H/TA/GSC/EHL/FHL  compartments soft and compressible  calves NTTP  palpable DP    Secondary Assessment:  NC/AT, NTTP of clavicles, NTTP of C-spine,T-spine, or L-spine in the midline and paraspinal areas; NTTP of pelvis  LUE: NTTP of Shoulder, Elbow, Wrist, Hand; NT with AROM/PROM of Shoulder, Elbow, Wrist, Hand; AIN/PIN/Med/Uln/Msc/Rad/Ax intact  RUE: NTTP of Shoulder, Elbow, Wrist, Hand; NT with AROM/PROM of Shoulder, Elbow, Wrist, Hand; AIN/PIN/Med/Uln/Msc/Rad/Ax intact   LLE: Able to SLR, NT with Log Roll, NT with Heel Strike, NTTP of Hip, Knee, Ankle, Foot; NT with AROM/PROM of Hip, Knee, Ankle, Foot; Q/H/GSC/TA/EHL/FHL intact          IMAGING:  XRs and CT: no acute fx or dslx (personal read)    Assessment/Plan:  79y Female with difficulty ambulating due to R hip discomfort s/p MF    -WBAT  -PT eval  -Pain control as needed  -ice application prn  -DVT ppx per primary team  -No acute orthopaedic surgical intervention indicated at this time. This patient is orthopaedically stable for discharge.   -Patient to follow up with Dr. Meeks as an outpatient for further evaluation and management.   -All of the patient's and family's questions and concerns were answered and addressed.  -Will discuss with attending, and advise if plan changes   79y Female presents with right hip pain s/p MF. Interview assisted by  and son at bedside due to baseline dementia.  witnessed the fall and states pt was able to ambulate after the fall but her gait was altered so they brought her to ED for evaluation. Denies head strike/LOC/other orthopedic injuries at this time. Patient is a home ambulator without assist at baseline. Pt had R femur IMN for a femoral shaft fx 25 years ago and fractured her distal interlocking screw in 2016 after a fall during a race. Denies AC use.    PAST MEDICAL & SURGICAL HISTORY:  No pertinent past medical history        Home Medications:    Allergies    iodine (Unknown)  penicillins (Unknown)    Intolerances                              13.7   11.20 )-----------( 281      ( 12 Mar 2024 21:20 )             40.2     03-12    142  |  111<H>  |  23  ----------------------------<  95  3.7   |  26  |  0.77    Ca    10.2<H>      12 Mar 2024 21:20    TPro  7.1  /  Alb  3.8  /  TBili  0.6  /  DBili  x   /  AST  26  /  ALT  28  /  AlkPhos  56  03-12    PT/INR - ( 12 Mar 2024 21:20 )   PT: 11.6 sec;   INR: 1.03 ratio         PTT - ( 12 Mar 2024 21:20 )  PTT:29.9 sec  Urinalysis Basic - ( 12 Mar 2024 23:36 )    Color: Yellow / Appearance: Cloudy / S.015 / pH: x  Gluc: x / Ketone: Negative mg/dL  / Bili: Negative / Urobili: 1.0 mg/dL   Blood: x / Protein: Negative mg/dL / Nitrite: Positive   Leuk Esterase: Moderate / RBC: 2 /HPF / WBC 90 /HPF   Sq Epi: x / Non Sq Epi: 0 /HPF / Bacteria: Too Numerous to count /HPF          Vital Signs Last 24 Hrs  T(C): 36.4 (12 Mar 2024 18:10), Max: 36.4 (12 Mar 2024 18:10)  T(F): 97.5 (12 Mar 2024 18:10), Max: 97.5 (12 Mar 2024 18:10)  HR: 63 (12 Mar 2024 18:10) (63 - 63)  BP: 179/70 (12 Mar 2024 18:10) (179/70 - 179/70)  BP(mean): --  RR: 18 (12 Mar 2024 18:10) (18 - 18)  SpO2: 98% (12 Mar 2024 18:10) (98% - 98%)    Parameters below as of 12 Mar 2024 18:10  Patient On (Oxygen Delivery Method): room air        PHYSICAL EXAM  General: NAD, Awake and Alert, follows commands  skin intact, incision CDI  NTTP around the hip or along the femur  able to SLR  no pain with axial load or log roll  SILT alyssa/saph/sp/dp/tib  +Q/H/TA/GSC/EHL/FHL  compartments soft and compressible  calves NTTP  palpable DP    Secondary Assessment:  NC/AT, NTTP of clavicles, NTTP of C-spine,T-spine, or L-spine in the midline and paraspinal areas; NTTP of pelvis  LUE: NTTP of Shoulder, Elbow, Wrist, Hand; NT with AROM/PROM of Shoulder, Elbow, Wrist, Hand; AIN/PIN/Med/Uln/Msc/Rad/Ax intact  RUE: NTTP of Shoulder, Elbow, Wrist, Hand; NT with AROM/PROM of Shoulder, Elbow, Wrist, Hand; AIN/PIN/Med/Uln/Msc/Rad/Ax intact   LLE: Able to SLR, NT with Log Roll, NT with Heel Strike, NTTP of Hip, Knee, Ankle, Foot; NT with AROM/PROM of Hip, Knee, Ankle, Foot; Q/H/GSC/TA/EHL/FHL intact          IMAGING:  XRs and CT: R superior pubic ramus fx (personal read)    Assessment/Plan:  79y Female with R superior pubic ramus s/p MF    -WBAT  -PT eval  -Pain control as needed  -ice application prn  -DVT ppx per primary team  -No acute orthopaedic surgical intervention indicated at this time. This patient is orthopaedically stable for discharge.   -Patient to follow up with Dr. Meeks as an outpatient for further evaluation and management.   -All of the patient's and family's questions and concerns were answered and addressed.  -Will discuss with attending, and advise if plan changes   79y Female presents with right hip pain s/p MF. Interview assisted by  and son at bedside due to baseline dementia.  witnessed the fall and states pt was able to ambulate after the fall but her gait was altered so they brought her to ED for evaluation. Denies head strike/LOC/other orthopedic injuries at this time. Patient is a home ambulator without assist at baseline. Pt had R femur IMN for a femoral shaft fx 25 years ago and fractured her distal interlocking screw in 2016 after a fall during a race. Denies AC use.    PAST MEDICAL & SURGICAL HISTORY:  No pertinent past medical history        Home Medications:    Allergies    iodine (Unknown)  penicillins (Unknown)    Intolerances                              13.7   11.20 )-----------( 281      ( 12 Mar 2024 21:20 )             40.2     03-12    142  |  111<H>  |  23  ----------------------------<  95  3.7   |  26  |  0.77    Ca    10.2<H>      12 Mar 2024 21:20    TPro  7.1  /  Alb  3.8  /  TBili  0.6  /  DBili  x   /  AST  26  /  ALT  28  /  AlkPhos  56  03-12    PT/INR - ( 12 Mar 2024 21:20 )   PT: 11.6 sec;   INR: 1.03 ratio         PTT - ( 12 Mar 2024 21:20 )  PTT:29.9 sec  Urinalysis Basic - ( 12 Mar 2024 23:36 )    Color: Yellow / Appearance: Cloudy / S.015 / pH: x  Gluc: x / Ketone: Negative mg/dL  / Bili: Negative / Urobili: 1.0 mg/dL   Blood: x / Protein: Negative mg/dL / Nitrite: Positive   Leuk Esterase: Moderate / RBC: 2 /HPF / WBC 90 /HPF   Sq Epi: x / Non Sq Epi: 0 /HPF / Bacteria: Too Numerous to count /HPF          Vital Signs Last 24 Hrs  T(C): 36.4 (12 Mar 2024 18:10), Max: 36.4 (12 Mar 2024 18:10)  T(F): 97.5 (12 Mar 2024 18:10), Max: 97.5 (12 Mar 2024 18:10)  HR: 63 (12 Mar 2024 18:10) (63 - 63)  BP: 179/70 (12 Mar 2024 18:10) (179/70 - 179/70)  BP(mean): --  RR: 18 (12 Mar 2024 18:10) (18 - 18)  SpO2: 98% (12 Mar 2024 18:10) (98% - 98%)    Parameters below as of 12 Mar 2024 18:10  Patient On (Oxygen Delivery Method): room air        PHYSICAL EXAM  General: NAD, Awake and Alert, follows commands  skin intact, incision CDI  mild TTP groin  NTTP around the hip or along the femur  able to SLR  no pain with axial load or log roll  SILT alyssa/saph/sp/dp/tib  +Q/H/TA/GSC/EHL/FHL  compartments soft and compressible  calves NTTP  palpable DP    Secondary Assessment:  NC/AT, NTTP of clavicles, NTTP of C-spine,T-spine, or L-spine in the midline and paraspinal areas; NTTP of pelvis  LUE: NTTP of Shoulder, Elbow, Wrist, Hand; NT with AROM/PROM of Shoulder, Elbow, Wrist, Hand; AIN/PIN/Med/Uln/Msc/Rad/Ax intact  RUE: NTTP of Shoulder, Elbow, Wrist, Hand; NT with AROM/PROM of Shoulder, Elbow, Wrist, Hand; AIN/PIN/Med/Uln/Msc/Rad/Ax intact   LLE: Able to SLR, NT with Log Roll, NT with Heel Strike, NTTP of Hip, Knee, Ankle, Foot; NT with AROM/PROM of Hip, Knee, Ankle, Foot; Q/H/GSC/TA/EHL/FHL intact          IMAGING:  XRs and CT: R superior pubic ramus fx (personal read)    Assessment/Plan:  79y Female with R superior pubic ramus s/p MF    -WBAT  -PT eval  -Pain control as needed  -ice application prn  -DVT ppx per primary team  -No acute orthopaedic surgical intervention indicated at this time. This patient is orthopaedically stable for discharge.   -Patient to follow up with Dr. Meeks as an outpatient for further evaluation and management.   -All of the patient's and family's questions and concerns were answered and addressed.  -Will discuss with attending, and advise if plan changes

## 2024-03-13 NOTE — CHART NOTE - NSCHARTNOTEFT_GEN_A_CORE
Chart reviewed, Pt was seen and examined, alert but Confused, unable to provide history, reports no pain     Vital Signs Last 24 Hrs  T(C): 36.6 (13 Mar 2024 16:00), Max: 36.7 (13 Mar 2024 00:57)  T(F): 97.9 (13 Mar 2024 16:00), Max: 98.1 (13 Mar 2024 00:57)  HR: 62 (13 Mar 2024 16:00) (62 - 103)  BP: 144/56 (13 Mar 2024 16:00) (144/56 - 188/111)  BP(mean): 74 (13 Mar 2024 16:00) (74 - 130)  RR: 16 (13 Mar 2024 16:00) (16 - 17)  SpO2: 94% (13 Mar 2024 16:00) (94% - 100%)    Parameters below as of 13 Mar 2024 16:00  Patient On (Oxygen Delivery Method): room air    PHYSICAL EXAM:    General: frail elderly female, in no acute distress, face flashed   Eyes: EOMI; conjunctiva and sclera clear  Head: Normocephalic; atraumatic  ENMT: No nasal discharge; airway clear, dry oral mucosa   Neck: Supple; non tender; no masses  Respiratory: decreased BS , No wheezes, rales or rhonchi  Cardiovascular: Regular rate and rhythm. S1 and S2 Normal;   Gastrointestinal: Soft non-tender non-distended; Normal bowel sounds  Genitourinary: No  suprapubic  tenderness, mild fullness  Extremities: No edema  Neurological: Alert and oriented x1, confused, non focal   Skin: Warm and dry. No acute rash  Musculoskeletal: Normal muscle tone, without deformities      A/P: 78 y/o F w/ PMH of dementia, dyslipidemia, PUD admitted for:     *R superior pubic ramus fracture s/p mechanical fall  - CT head no acute findings, Small chronic right basal ganglia lacunar infarcts.  - XR/CT LE: acute pubic rimi FX, no  acute  femoral Fx  -Pain control  - fall precautions   -Patient evaluated by ortho at bedside and recommends: WBAT, PT, ice PRN    * Abnormal UA, suspected UTI  -Ceftriaxone  -F/u urine cx   -Mild lactic acidosis  improved after IVF   - monitor for fevers, Tylenol PRN     * Hypertensive urgency   no H/o HTN  Control pain   Hydralazine IV PRN if no improvement will start PO meds     * Azotemia/Dehydration on exam   Will give Gentle IVF   labs in am       * Hypokalemia/Hypomagnesemia  replace IV/PO   recheck in am       * Dysphagia   speech and swallow consulted  cleared for thin liquids and soft and bite size   aspiration precautions     *H/o dementia, also suspect metabolic encephalopathy in settings of infection   supportive care  C/w Duloxetine and Seroquel     PUD   C/w PPI     *DVT ppx   -Heparin SubQ

## 2024-03-13 NOTE — SBIRT NOTE ADULT - NSSBIRTUNABLESCROTHER_GEN_A_CORE
Patient with dementia at baseline and unable to answer any patient profile questions. Consult inappropriate. Sw to close out consult.

## 2024-03-13 NOTE — DIETITIAN INITIAL EVALUATION ADULT - PERTINENT LABORATORY DATA
03-13    137  |  108  |  17  ----------------------------<  135<H>  2.7<LL>   |  20<L>  |  0.68    Ca    9.6      13 Mar 2024 07:50  Mg     1.9     03-13    TPro  7.3  /  Alb  3.7  /  TBili  0.8  /  DBili  x   /  AST  24  /  ALT  26  /  AlkPhos  57  03-13

## 2024-03-13 NOTE — DIETITIAN INITIAL EVALUATION ADULT - PERTINENT MEDS FT
MEDICATIONS  (STANDING):  atorvastatin 40 milliGRAM(s) Oral at bedtime  cefTRIAXone Injectable. 1000 milliGRAM(s) IV Push every 24 hours  DULoxetine 30 milliGRAM(s) Oral daily  heparin   Injectable 5000 Unit(s) SubCutaneous every 12 hours  pantoprazole    Tablet 40 milliGRAM(s) Oral before breakfast    MEDICATIONS  (PRN):  acetaminophen     Tablet .. 650 milliGRAM(s) Oral every 6 hours PRN Temp greater or equal to 38C (100.4F), Mild Pain (1 - 3)  hydrALAZINE Injectable 10 milliGRAM(s) IV Push every 6 hours PRN SBP>160, DBP>90  ondansetron Injectable 4 milliGRAM(s) IV Push once PRN Nausea and/or Vomiting  oxyCODONE    IR 5 milliGRAM(s) Oral every 4 hours PRN Moderate Pain (4 - 6)  QUEtiapine 25 milliGRAM(s) Oral at bedtime PRN for agitation

## 2024-03-13 NOTE — CONSULT NOTE ADULT - CONSULT REASON
R hip pain, difficulty ambulating R hip pain, difficulty ambulating  time called: 0030  time seen: 0040

## 2024-03-13 NOTE — SWALLOW BEDSIDE ASSESSMENT ADULT - SWALLOW EVAL: RECOMMENDED FEEDING/EATING TECHNIQUES
allow for swallow between intakes/alternate food with liquid/check mouth frequently for oral residue/pocketing/crush medication (when feasible)/hard swallow w/ each bite or sip/maintain upright posture during/after eating for 30 mins/position upright (90 degrees)/small sips/bites

## 2024-03-13 NOTE — SWALLOW BEDSIDE ASSESSMENT ADULT - ORAL PREPARATORY PHASE
oral acceptance and containment; lip protrusion and closure for soon stripping. lip seal on cup/Within functional limits

## 2024-03-13 NOTE — DIETITIAN INITIAL EVALUATION ADULT - ORAL INTAKE PTA/DIET HISTORY
Son reports Pt lives with spouse.  Likely consuming 2 meals per day, breakfast and dinner, with no specific diet followed.  Unable to provide details for diet recall.  Denies changes to Pt appetite PTA.  Nidia meeting 75% ENN.

## 2024-03-13 NOTE — DIETITIAN INITIAL EVALUATION ADULT - NS FNS DIET ORDER
Diet, Soft and Bite Sized:   Supplement Feeding Modality:  Oral  Ensure Enlive Cans or Servings Per Day:  3       Frequency:  Three Times a day (03-13-24 @ 12:59)

## 2024-03-13 NOTE — SWALLOW BEDSIDE ASSESSMENT ADULT - SLP GENERAL OBSERVATIONS
pt lying in bed, awake and watchful, sparse verbal output, stated she needed the bathroom, but nothing else.  Pt has hearing loss.

## 2024-03-13 NOTE — PATIENT PROFILE ADULT - NSPROGENSOURCEINFO_GEN_A_NUR
hx: Dementia; unable to answer any questions/unable to respond/lacks capacity and has no surrogate decision maker

## 2024-03-13 NOTE — PHYSICAL THERAPY INITIAL EVALUATION ADULT - GENERAL OBSERVATIONS, REHAB EVAL
Pt seen for 30min eval,  and son bedside. Pt appears lethargic at times, waking to verbal and tactile stimuli, however falling back asleep easily. Pt trans supine<>Sit with mod Ax1, no c/o pain. defer OOB 2/2 lethargy. Pt returned semi supine all needs met, RN aware, +bed alarm.

## 2024-03-13 NOTE — SWALLOW BEDSIDE ASSESSMENT ADULT - SWALLOW EVAL: DIAGNOSIS
presbyphagia, presently pt will de better on mildly altered diet to soft and bite size, given mld  dis-coordination in mastication.

## 2024-03-13 NOTE — PATIENT PROFILE ADULT - FALL HARM RISK - HARM RISK INTERVENTIONS
Assistance with ambulation/Assistance OOB with selected safe patient handling equipment/Communicate Risk of Fall with Harm to all staff/Discuss with provider need for PT consult/Monitor gait and stability/Reinforce activity limits and safety measures with patient and family/Tailored Fall Risk Interventions/Visual Cue: Yellow wristband and red socks/Bed in lowest position, wheels locked, appropriate side rails in place/Call bell, personal items and telephone in reach/Instruct patient to call for assistance before getting out of bed or chair/Non-slip footwear when patient is out of bed/[place] to call system/Physically safe environment - no spills, clutter or unnecessary equipment/Purposeful Proactive Rounding/Room/bathroom lighting operational, light cord in reach

## 2024-03-14 LAB
ANION GAP SERPL CALC-SCNC: 7 MMOL/L — SIGNIFICANT CHANGE UP (ref 5–17)
BUN SERPL-MCNC: 15 MG/DL — SIGNIFICANT CHANGE UP (ref 7–23)
CALCIUM SERPL-MCNC: 9.2 MG/DL — SIGNIFICANT CHANGE UP (ref 8.5–10.1)
CHLORIDE SERPL-SCNC: 111 MMOL/L — HIGH (ref 96–108)
CO2 SERPL-SCNC: 20 MMOL/L — LOW (ref 22–31)
CREAT SERPL-MCNC: 0.63 MG/DL — SIGNIFICANT CHANGE UP (ref 0.5–1.3)
CULTURE RESULTS: NO GROWTH — SIGNIFICANT CHANGE UP
EGFR: 89 ML/MIN/1.73M2 — SIGNIFICANT CHANGE UP
FOLATE SERPL-MCNC: 19.9 NG/ML — SIGNIFICANT CHANGE UP
GLUCOSE SERPL-MCNC: 123 MG/DL — HIGH (ref 70–99)
HCT VFR BLD CALC: 40.4 % — SIGNIFICANT CHANGE UP (ref 34.5–45)
HGB BLD-MCNC: 14.1 G/DL — SIGNIFICANT CHANGE UP (ref 11.5–15.5)
MAGNESIUM SERPL-MCNC: 2.2 MG/DL — SIGNIFICANT CHANGE UP (ref 1.6–2.6)
MCHC RBC-ENTMCNC: 30.9 PG — SIGNIFICANT CHANGE UP (ref 27–34)
MCHC RBC-ENTMCNC: 34.9 GM/DL — SIGNIFICANT CHANGE UP (ref 32–36)
MCV RBC AUTO: 88.6 FL — SIGNIFICANT CHANGE UP (ref 80–100)
PLATELET # BLD AUTO: 311 K/UL — SIGNIFICANT CHANGE UP (ref 150–400)
POTASSIUM SERPL-MCNC: 3.4 MMOL/L — LOW (ref 3.5–5.3)
POTASSIUM SERPL-SCNC: 3.4 MMOL/L — LOW (ref 3.5–5.3)
RBC # BLD: 4.56 M/UL — SIGNIFICANT CHANGE UP (ref 3.8–5.2)
RBC # FLD: 13.4 % — SIGNIFICANT CHANGE UP (ref 10.3–14.5)
SODIUM SERPL-SCNC: 138 MMOL/L — SIGNIFICANT CHANGE UP (ref 135–145)
SPECIMEN SOURCE: SIGNIFICANT CHANGE UP
TSH SERPL-MCNC: 4.18 UU/ML — SIGNIFICANT CHANGE UP (ref 0.34–4.82)
VIT B12 SERPL-MCNC: 1012 PG/ML — SIGNIFICANT CHANGE UP (ref 232–1245)
WBC # BLD: 13.76 K/UL — HIGH (ref 3.8–10.5)
WBC # FLD AUTO: 13.76 K/UL — HIGH (ref 3.8–10.5)

## 2024-03-14 PROCEDURE — 99233 SBSQ HOSP IP/OBS HIGH 50: CPT

## 2024-03-14 RX ORDER — DEXTROSE MONOHYDRATE, SODIUM CHLORIDE, AND POTASSIUM CHLORIDE 50; .745; 4.5 G/1000ML; G/1000ML; G/1000ML
1000 INJECTION, SOLUTION INTRAVENOUS
Refills: 0 | Status: DISCONTINUED | OUTPATIENT
Start: 2024-03-14 | End: 2024-03-18

## 2024-03-14 RX ORDER — LOSARTAN POTASSIUM 100 MG/1
25 TABLET, FILM COATED ORAL DAILY
Refills: 0 | Status: DISCONTINUED | OUTPATIENT
Start: 2024-03-14 | End: 2024-03-15

## 2024-03-14 RX ORDER — POTASSIUM CHLORIDE 20 MEQ
40 PACKET (EA) ORAL EVERY 4 HOURS
Refills: 0 | Status: COMPLETED | OUTPATIENT
Start: 2024-03-14 | End: 2024-03-14

## 2024-03-14 RX ADMIN — Medication 40 MILLIEQUIVALENT(S): at 13:54

## 2024-03-14 RX ADMIN — CEFTRIAXONE 1000 MILLIGRAM(S): 500 INJECTION, POWDER, FOR SOLUTION INTRAMUSCULAR; INTRAVENOUS at 20:59

## 2024-03-14 RX ADMIN — ATORVASTATIN CALCIUM 40 MILLIGRAM(S): 80 TABLET, FILM COATED ORAL at 20:59

## 2024-03-14 RX ADMIN — Medication 10 MILLIGRAM(S): at 20:59

## 2024-03-14 RX ADMIN — Medication 10 MILLIGRAM(S): at 04:28

## 2024-03-14 RX ADMIN — HEPARIN SODIUM 5000 UNIT(S): 5000 INJECTION INTRAVENOUS; SUBCUTANEOUS at 20:59

## 2024-03-14 RX ADMIN — DULOXETINE HYDROCHLORIDE 30 MILLIGRAM(S): 30 CAPSULE, DELAYED RELEASE ORAL at 10:22

## 2024-03-14 RX ADMIN — DEXTROSE MONOHYDRATE, SODIUM CHLORIDE, AND POTASSIUM CHLORIDE 75 MILLILITER(S): 50; .745; 4.5 INJECTION, SOLUTION INTRAVENOUS at 13:53

## 2024-03-14 RX ADMIN — Medication 650 MILLIGRAM(S): at 22:22

## 2024-03-14 RX ADMIN — QUETIAPINE FUMARATE 25 MILLIGRAM(S): 200 TABLET, FILM COATED ORAL at 20:59

## 2024-03-14 RX ADMIN — Medication 650 MILLIGRAM(S): at 22:52

## 2024-03-14 RX ADMIN — LOSARTAN POTASSIUM 25 MILLIGRAM(S): 100 TABLET, FILM COATED ORAL at 16:34

## 2024-03-14 RX ADMIN — HEPARIN SODIUM 5000 UNIT(S): 5000 INJECTION INTRAVENOUS; SUBCUTANEOUS at 10:22

## 2024-03-14 RX ADMIN — Medication 40 MILLIEQUIVALENT(S): at 11:53

## 2024-03-14 NOTE — PROGRESS NOTE ADULT - ASSESSMENT
A/P: 80 y/o F w/ PMH of dementia, dyslipidemia, PUD admitted for:     *R superior pubic ramus fracture s/p mechanical fall  - CT head no acute findings, Small chronic right basal ganglia lacunar infarcts.  - XR/CT LE: acute pubic rimi FX, no  acute  femoral Fx  -Pain control  - fall precautions   -Patient evaluated by ortho at bedside and recommends: WBAT, PT, ice PRN    * Abnormal UA, suspected UTI  -Ceftriaxone  -F/u urine cx   -Mild lactic acidosis  improved after IVF   - monitor for fevers, Tylenol PRN     * Hypertensive urgency   no H/o HTN  Control pain   Hydralazine IV PRN if no improvement will start PO meds     * Azotemia/Dehydration on exam   Will give Gentle IVF   labs in am       * Hypokalemia/Hypomagnesemia  replace IV/PO   recheck in am       * Dysphagia   speech and swallow consulted  cleared for thin liquids and soft and bite size   aspiration precautions     *H/o dementia, also suspect metabolic encephalopathy in settings of infection   supportive care  C/w Duloxetine and Seroquel     PUD   C/w PPI     *DVT ppx   -Heparin SubQ.   A/P: 80 y/o F w/ PMH of dementia, dyslipidemia, PUD admitted for:     *R superior pubic ramus fracture s/p mechanical fall  - CT head no acute findings, Small chronic right basal ganglia lacunar infarcts.  - XR/CT LE: acute pubic rimi FX, no  acute  femoral Fx  -Pain control  - fall precautions   -Patient evaluated by ortho at bedside and recommends: WBAT, PT, ice PRN  - check orthostatic VS     * Abnormal UA, suspected UTI  - C/w  IV Ceftriaxone  -F/u urine cx still pending   -Mild lactic acidosis  improved after IVF   - monitor for fevers, Tylenol PRN     * Hypertensive urgency   no H/o HTN  Control pain   BP better but still elevated   start Losartan in am   Hydralazine IV PRN    * Azotemia/Dehydration on exam   C/w Gentle IVF   labs in am       * Hypokalemia/Hypomagnesemia  replace IV/PO   recheck in am       * Dysphagia   speech and swallow consulted  cleared for thin liquids and soft and bite size   aspiration precautions   S&S f/u    *H/o dementia, also suspect metabolic encephalopathy in settings of infection   supportive care  C/w Duloxetine and Seroquel     PUD   C/w PPI     Dark BM  will send FOBT  Monitor H/H, stable for now     *DVT ppx   -Heparin SubQ.    Dispo; IVF, IV Abxs, f/u UCX, PT will likely need ALIREZA

## 2024-03-14 NOTE — PROGRESS NOTE ADULT - SUBJECTIVE AND OBJECTIVE BOX
CC: Urinary tract infection        HPI: 78 y/o F w/ PMH of dementia, dyslipidemia, PUD, p/w mechanical fall. Patient unable to provide history 2/2 dementia. As per chart, patient's  had reported that patient tripped and fell in the garage and c/o pain in R hip / groin area.  witnessed episode, and denies LOC / hitting head. Patient unable to get up after fall 2/2 pain. Patient denies any pain at this time, and states she's comfortable       INTERVAL HPI/OVERNIGHT EVENTS: Pt was seen and examined     Vital Signs Last 24 Hrs  T(C): 37.1 (14 Mar 2024 07:36), Max: 37.1 (14 Mar 2024 07:36)  T(F): 98.8 (14 Mar 2024 07:36), Max: 98.8 (14 Mar 2024 07:36)  HR: 101 (14 Mar 2024 07:36) (62 - 105)  BP: 147/58 (14 Mar 2024 07:36) (141/73 - 188/111)  BP(mean): 92 (13 Mar 2024 20:07) (74 - 122)  RR: 16 (14 Mar 2024 07:36) (16 - 17)  SpO2: 95% (14 Mar 2024 07:36) (94% - 100%)    Parameters below as of 14 Mar 2024 07:36  Patient On (Oxygen Delivery Method): room air        REVIEW OF SYSTEMS:  All other review of systems is negative unless indicated above.      PHYSICAL EXAM:  General: in no acute distress  Eyes: PERRLA, EOMI; conjunctiva and sclera clear  Head: Normocephalic; atraumatic  ENMT: No nasal discharge; airway clear  Neck: Supple; non tender; no masses  Respiratory: No wheezes, rales or rhonchi  Cardiovascular: Regular rate and rhythm. S1 and S2 Normal; No murmurs, gallops or rubs  Gastrointestinal: Soft non-tender non-distended; Normal bowel sounds  Genitourinary: No  suprapubic  tenderness  Extremities: Normal range of motion, No clubbing, cyanosis or edema  Vascular: Peripheral pulses palpable 2+ bilaterally  Neurological: Alert and oriented x4  Skin: Warm and dry. No acute rash  Lymph Nodes: No acute cervical adenopathy  Musculoskeletal: Normal muscle tone, without deformities  Psychiatric: Cooperative and appropriate      LABS:                         14.1   13.76 )-----------( 311      ( 14 Mar 2024 07:55 )             40.4     14 Mar 2024 07:55    138    |  111    |  15     ----------------------------<  123    3.4     |  20     |  0.63     Ca    9.2        14 Mar 2024 07:55  Mg     2.2       14 Mar 2024 07:55    TPro  7.3    /  Alb  3.7    /  TBili  0.8    /  DBili  x      /  AST  24     /  ALT  26     /  AlkPhos  57     13 Mar 2024 07:50  PT/INR - ( 13 Mar 2024 07:50 )   PT: 11.0 sec;   INR: 0.97 ratio    PTT - ( 13 Mar 2024 07:50 )  PTT:29.4 sec        LIVER FUNCTIONS - ( 13 Mar 2024 07:50 )  Alb: 3.7 g/dL / Pro: 7.3 gm/dL / ALK PHOS: 57 U/L / ALT: 26 U/L / AST: 24 U/L / GGT: x           Urinalysis Basic - ( 14 Mar 2024 07:55 )    Color: x / Appearance: x / SG: x / pH: x  Gluc: 123 mg/dL / Ketone: x  / Bili: x / Urobili: x   Blood: x / Protein: x / Nitrite: x   Leuk Esterase: x / RBC: x / WBC x   Sq Epi: x / Non Sq Epi: x / Bacteria: x        MEDICATIONS  (STANDING):  atorvastatin 40 milliGRAM(s) Oral at bedtime  cefTRIAXone Injectable. 1000 milliGRAM(s) IV Push every 24 hours  DULoxetine 30 milliGRAM(s) Oral daily  heparin   Injectable 5000 Unit(s) SubCutaneous every 12 hours  pantoprazole    Tablet 40 milliGRAM(s) Oral before breakfast  potassium chloride    Tablet ER 40 milliEquivalent(s) Oral every 4 hours  sodium chloride 0.9% with potassium chloride 20 mEq/L 1000 milliLiter(s) (75 mL/Hr) IV Continuous <Continuous>    MEDICATIONS  (PRN):  acetaminophen     Tablet .. 650 milliGRAM(s) Oral every 6 hours PRN Temp greater or equal to 38C (100.4F), Mild Pain (1 - 3)  hydrALAZINE Injectable 10 milliGRAM(s) IV Push every 6 hours PRN SBP>160, DBP>90  ondansetron Injectable 4 milliGRAM(s) IV Push once PRN Nausea and/or Vomiting  oxyCODONE    IR 5 milliGRAM(s) Oral every 4 hours PRN Moderate Pain (4 - 6)  QUEtiapine 25 milliGRAM(s) Oral at bedtime PRN for agitation      RADIOLOGY & ADDITIONAL TESTS:    ACC: [id number] EXAM:  CT 3D RECONSTRUCT WO [facility]   ORDERED BY:   LUZ ROJAS     ACC: [id number] EXAM:  CT FEMUR ONLY RT   ORDERED BY: SHUN PINEDA     ACC: [id number] EXAM:  CT PELVIS BONY ONLY   ORDERED BY: LUZ ROJAS     ACC: [id number] EXAM:  CT 3D RECONSTRUCT WO [facility]   ORDERED BY: SHUN PINEDA     PROCEDURE DATE:  03/12/2024          INTERPRETATION:  HISTORY: Status post fall with pelvic and right femoral   pain.    Helical CT imaging of the pelvis and right femur was performed without   intravenous contrast. Sagittal and coronal reformats were provided. 3-D   reformats were performed on a separate workstation.    Correlation is made with prior radiographs from same day.    FINDINGS:    Bony pelvis: There is anacute nondisplaced fracture of the right   superior pubic ramus at the pubic symphyseal junction.    Patient is status post fixation of the right femur. Please refer to CT of   the right femur below. Bilateral hip joint spaces are preserved. 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 There is prominent lower lumbar   spondylosis with mild dextroscoliosis. Mild degenerative grade 1   anterolisthesis is seen at L3-L4 and L4-L5. Vacuum disc phenomenon is   seen at L3-L4 and L4-L5.    No subcutaneous or intramuscular hematoma. No disproportionate fatty   atrophy of musculature. Atherosclerotic disease.    Right femur: Patient is status post open reduction internal fixation of   the right femur with a long femoral intramedullary jinny with an   interlocking screw in the intertrochanteric region and interlocking screw   at the distal femoral diametaphysis. The distal interlocking screw is   fractured at its midportion within the intramedullary jinny with associated   angulation. This is unchanged compared to previous radio graphs from   September 17, 2016. Healed fracture deformity seen at the distal femoral   diaphysis. Heterotopic bone formation is seen at the greater trochanter.   No evidence of acute fracture or dislocation of the rightfemur.    Right hip joint space is preserved. There is mild tricompartmental   arthrosis of the knee with chondrocalcinosis. No knee joint effusion.    Atherosclerotic disease. No subcutaneous or intramuscular hematoma.    IMPRESSION:    Acute nondisplaced fracture of the right superior pubic ramus at the   pubic symphyseal junction.    No evidence of acute fracture or dislocation of the right femur.   Unchanged appearance of fixation hardware within the right femur.        ACC: [id number] EXAM:  XR CHEST 1 VIEW   ORDERED BY: LUZ ROJAS     ACC: [id number] EXAM:  XR HIP WITH PELV 2-3V RT   [facility]     ACC: [id number] EXAM:  XR FEMUR 2 VIEWS RT   ORDERED BY: LUZ ROJAS     ACC: [id number] EXAM:  XR PELVIS AP ONLY 1-2 VIEWS   ORDERED BY: SHUN PINEDA     PROCEDURE DATE:  03/12/2024          INTERPRETATION:  Clinical History: 79-year-old female, fall.    Four views of the right femur, AP view of the pelvis hip, cross table   lateral of the right hip are correlated with a concurrent pelvic and   right femoral CT studies. Single view of the chest    FINDINGS: MSK: Acute nondisplaced fracture of the right superior pubic   ramus evident on CT. No hardware loosening or other acute finding.    CHEST: Normal cardiac silhouette and normal pulmonary vasculature with no   consolidation, effusion, pneumothorax or acute osseous finding    IMPRESSION:    Nondisplaced fracture right superior pubic ramus medially evident on CT.    Negative chest          ACC: [id number] EXAM:  CT 3D RECONSTRUCT WO [facility]   ORDERED BY:   LUZ ROJAS     ACC: [id number] EXAM:  CT FEMUR ONLY RT   ORDERED BY: SHUN PINEDA     ACC: [id number] EXAM:  CT PELVIS BONY ONLY   [facility] LU[facility]   [facility]     ACC: [id number] EXAM:  CT 3D RECONSTRUCT WO [facility]   ORDERED BY: SHUN PINEDA     PROCEDURE DATE:  03/12/2024          INTERPRETATION:  HISTORY: Status post fall with pelvic and right femoral   pain.    Helical CT imaging of the pelvis and right femur was performed without   intravenous contrast. Sagittal and coronal reformats were provided. 3-D   reformats were performed on a separate workstation.    Correlation is made with prior radiographs from same day.    FINDINGS:    Bony pelvis: There is anacute nondisplaced fracture of the right   superior pubic ramus at the pubic symphyseal junction.    Patient is status post fixation of the right femur. Please refer to CT of   the right femur below. Bilateral hip joint spaces are preserved. 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 There is prominent lower lumbar   spondylosis with mild dextroscoliosis. Mild degenerative grade 1   anterolisthesis is seen at L3-L4 and L4-L5. Vacuum disc phenomenon is   seen at L3-L4 and L4-L5.    No subcutaneous or intramuscular hematoma. No disproportionate fatty   atrophy of musculature. Atherosclerotic disease.    Right femur: Patient is status post open reduction internal fixation of   the right femur with a long femoral intramedullary jinny with an   interlocking screw in the intertrochanteric region and interlocking screw   at the distal femoral diametaphysis. The distal interlocking screw is   fractured at its midportion within the intramedullary jinny with associated   angulation. This is unchanged compared to previous radio graphs from   September 17, 2016. Healed fracture deformity seen at the distal femoral   diaphysis. Heterotopic bone formation is seen at the greater trochanter.   No evidence of acute fracture or dislocation of the rightfemur.    Right hip joint space is preserved. There is mild tricompartmental   arthrosis of the knee with chondrocalcinosis. No knee joint effusion.    Atherosclerotic disease. No subcutaneous or intramuscular hematoma.    IMPRESSION:    Acute nondisplaced fracture of the right superior pubic ramus at the   pubic symphyseal junction.    No evidence of acute fracture or dislocation of the right femur.   Unchanged appearance of fixation hardware within the right femur.         CC: Urinary tract infection        HPI: 80 y/o F w/ PMH of dementia, dyslipidemia, PUD, p/w mechanical fall. Patient unable to provide history 2/2 dementia. As per chart, patient's  had reported that patient tripped and fell in the garage and c/o pain in R hip / groin area.  witnessed episode, and denies LOC / hitting head. Patient unable to get up after fall 2/2 pain. Patient denies any pain at this time, and states she's comfortable       INTERVAL HPI/ OVERNIGHT EVENTS: Pt was seen and examined, OOB to chair, looks stronger, reports no pain, no SOB, but overall very confused. D/w RN, as per family mental status about at baseline. Has ok oral intake,  participated with PT. Had large very dark BM    Vital Signs Last 24 Hrs  T(C): 37.1 (14 Mar 2024 07:36), Max: 37.1 (14 Mar 2024 07:36)  T(F): 98.8 (14 Mar 2024 07:36), Max: 98.8 (14 Mar 2024 07:36)  HR: 101 (14 Mar 2024 07:36) (62 - 105)  BP: 147/58 (14 Mar 2024 07:36) (141/73 - 188/111)  BP(mean): 92 (13 Mar 2024 20:07) (74 - 122)  RR: 16 (14 Mar 2024 07:36) (16 - 17)  SpO2: 95% (14 Mar 2024 07:36) (94% - 100%)    Parameters below as of 14 Mar 2024 07:36  Patient On (Oxygen Delivery Method): room air        REVIEW OF SYSTEMS:  All other review of systems is negative unless indicated above.      PHYSICAL EXAM:  General:  frail elderly female,  in no acute distress  Eyes:  EOMI; conjunctiva and sclera clear  Head: Normocephalic; atraumatic  ENMT: No nasal discharge; airway clear, dry oral mucosa   Neck: Supple; non tender; no masses  Respiratory: No wheezes, rales or rhonchi  Cardiovascular: Regular rate and rhythm. S1 and S2 Normal;   Gastrointestinal: Soft non-tender non-distended; Normal bowel sounds  Genitourinary: No  suprapubic  tenderness  Extremities: No  edema  Vascular: Peripheral pulses palpable 2+ bilaterally  Neurological: Alert and oriented x2, speech is slow, but clear. No facial asymmetry, No pronator drift. MS 5/5   Musculoskeletal: Normal muscle tone, without deformities  Psychiatric: Cooperative       LABS:                         14.1   13.76 )-----------( 311      ( 14 Mar 2024 07:55 )             40.4     14 Mar 2024 07:55    138    |  111    |  15     ----------------------------<  123    3.4     |  20     |  0.63     Ca    9.2        14 Mar 2024 07:55  Mg     2.2       14 Mar 2024 07:55    TPro  7.3    /  Alb  3.7    /  TBili  0.8    /  DBili  x      /  AST  24     /  ALT  26     /  AlkPhos  57     13 Mar 2024 07:50  PT/INR - ( 13 Mar 2024 07:50 )   PT: 11.0 sec;   INR: 0.97 ratio    PTT - ( 13 Mar 2024 07:50 )  PTT:29.4 sec        LIVER FUNCTIONS - ( 13 Mar 2024 07:50 )  Alb: 3.7 g/dL / Pro: 7.3 gm/dL / ALK PHOS: 57 U/L / ALT: 26 U/L / AST: 24 U/L / GGT: x           Urinalysis Basic - ( 14 Mar 2024 07:55 )    Color: x / Appearance: x / SG: x / pH: x  Gluc: 123 mg/dL / Ketone: x  / Bili: x / Urobili: x   Blood: x / Protein: x / Nitrite: x   Leuk Esterase: x / RBC: x / WBC x   Sq Epi: x / Non Sq Epi: x / Bacteria: x        MEDICATIONS  (STANDING):  atorvastatin 40 milliGRAM(s) Oral at bedtime  cefTRIAXone Injectable. 1000 milliGRAM(s) IV Push every 24 hours  DULoxetine 30 milliGRAM(s) Oral daily  heparin   Injectable 5000 Unit(s) SubCutaneous every 12 hours  pantoprazole    Tablet 40 milliGRAM(s) Oral before breakfast  potassium chloride    Tablet ER 40 milliEquivalent(s) Oral every 4 hours  sodium chloride 0.9% with potassium chloride 20 mEq/L 1000 milliLiter(s) (75 mL/Hr) IV Continuous <Continuous>    MEDICATIONS  (PRN):  acetaminophen     Tablet .. 650 milliGRAM(s) Oral every 6 hours PRN Temp greater or equal to 38C (100.4F), Mild Pain (1 - 3)  hydrALAZINE Injectable 10 milliGRAM(s) IV Push every 6 hours PRN SBP>160, DBP>90  ondansetron Injectable 4 milliGRAM(s) IV Push once PRN Nausea and/or Vomiting  oxyCODONE    IR 5 milliGRAM(s) Oral every 4 hours PRN Moderate Pain (4 - 6)  QUEtiapine 25 milliGRAM(s) Oral at bedtime PRN for agitation      RADIOLOGY & ADDITIONAL TESTS:    ACC: [id number] EXAM:  CT 3D RECONSTRUCT WO [facility]   ORDERED BY:   LUZ ROJAS     ACC: [id number] EXAM:  CT FEMUR ONLY RT   ORDERED BY: SHUN PINEDA     ACC: [id number] EXAM:  CT PELVIS BONY ONLY   ORDERED BY: LUZ ROJAS     ACC: [id number] EXAM:  CT 3D RECONSTRUCT WO [facility]   ORDERED BY: SHUN PINEDA     PROCEDURE DATE:  03/12/2024          INTERPRETATION:  HISTORY: Status post fall with pelvic and right femoral   pain.    Helical CT imaging of the pelvis and right femur was performed without   intravenous contrast. Sagittal and coronal reformats were provided. 3-D   reformats were performed on a separate workstation.    Correlation is made with prior radiographs from same day.    FINDINGS:    Bony pelvis: There is anacute nondisplaced fracture of the right   superior pubic ramus at the pubic symphyseal junction.    Patient is status post fixation of the right femur. Please refer to CT of   the right femur below. Bilateral hip joint spaces are preserved. 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 There is prominent lower lumbar   spondylosis with mild dextroscoliosis. Mild degenerative grade 1   anterolisthesis is seen at L3-L4 and L4-L5. Vacuum disc phenomenon is   seen at L3-L4 and L4-L5.    No subcutaneous or intramuscular hematoma. No disproportionate fatty   atrophy of musculature. Atherosclerotic disease.    Right femur: Patient is status post open reduction internal fixation of   the right femur with a long femoral intramedullary jinny with an   interlocking screw in the intertrochanteric region and interlocking screw   at the distal femoral diametaphysis. The distal interlocking screw is   fractured at its midportion within the intramedullary jinny with associated   angulation. This is unchanged compared to previous radio graphs from   September 17, 2016. Healed fracture deformity seen at the distal femoral   diaphysis. Heterotopic bone formation is seen at the greater trochanter.   No evidence of acute fracture or dislocation of the rightfemur.    Right hip joint space is preserved. There is mild tricompartmental   arthrosis of the knee with chondrocalcinosis. No knee joint effusion.    Atherosclerotic disease. No subcutaneous or intramuscular hematoma.    IMPRESSION:    Acute nondisplaced fracture of the right superior pubic ramus at the   pubic symphyseal junction.    No evidence of acute fracture or dislocation of the right femur.   Unchanged appearance of fixation hardware within the right femur.        ACC: [id number] EXAM:  XR CHEST 1 VIEW   ORDERED BY: LUZ CONTINO     ACC: [id number] EXAM:  XR HIP WITH PELV 2-3V RT   ORDERED BY: LUZ CLAROSO     ACC: [id number] EXAM:  XR FEMUR 2 VIEWS RT   ORDERED BY: LUZ ROJAS     ACC: [id number] EXAM:  XR PELVIS AP ONLY 1-2 VIEWS   ORDERED BY: SHUN PINEDA     PROCEDURE DATE:  03/12/2024          INTERPRETATION:  Clinical History: 79-year-old female, fall.    Four views of the right femur, AP view of the pelvis hip, cross table   lateral of the right hip are correlated with a concurrent pelvic and   right femoral CT studies. Single view of the chest    FINDINGS: MSK: Acute nondisplaced fracture of the right superior pubic   ramus evident on CT. No hardware loosening or other acute finding.    CHEST: Normal cardiac silhouette and normal pulmonary vasculature with no   consolidation, effusion, pneumothorax or acute osseous finding    IMPRESSION:    Nondisplaced fracture right superior pubic ramus medially evident on CT.    Negative chest          ACC: [id number] [facility]   ORDERED BY:   LUZ CONTINO     ACC: [id number] EXAM:  CT FEMUR ONLY RT   ORDERED BY: SHUN PINEDA     ACC: [id number] EXAM:  CT PELVIS BONY ONLY   ORDERED BY: LUZ   CONTINO     ACC: [id number] EXAM:  CT 3D RECONSTRUCT WO [facility]   ORDERED BY: SHUN PINEDA     PROCEDURE DATE:  03/12/2024          INTERPRETATION:  HISTORY: Status post fall with pelvic and right femoral   pain.    Helical CT imaging of the pelvis and right femur was performed without   intravenous contrast. Sagittal and coronal reformats were provided. 3-D   reformats were performed on a separate workstation.    Correlation is made with prior radiographs from same day.    FINDINGS:    Bony pelvis: There is anacute nondisplaced fracture of the right   superior pubic ramus at the pubic symphyseal junction.    Patient is status post fixation of the right femur. Please refer to CT of   the right femur below. Bilateral hip joint spaces are preserved. 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 There is prominent lower lumbar   spondylosis with mild dextroscoliosis. Mild degenerative grade 1   anterolisthesis is seen at L3-L4 and L4-L5. Vacuum disc phenomenon is   seen at L3-L4 and L4-L5.    No subcutaneous or intramuscular hematoma. No disproportionate fatty   atrophy of musculature. Atherosclerotic disease.    Right femur: Patient is status post open reduction internal fixation of   the right femur with a long femoral intramedullary jinny with an   interlocking screw in the intertrochanteric region and interlocking screw   at the distal femoral diametaphysis. The distal interlocking screw is   fractured at its midportion within the intramedullary jinny with associated   angulation. This is unchanged compared to previous radio graphs from   September 17, 2016. Healed fracture deformity seen at the distal femoral   diaphysis. Heterotopic bone formation is seen at the greater trochanter.   No evidence of acute fracture or dislocation of the rightfemur.    Right hip joint space is preserved. There is mild tricompartmental   arthrosis of the knee with chondrocalcinosis. No knee joint effusion.    Atherosclerotic disease. No subcutaneous or intramuscular hematoma.    IMPRESSION:    Acute nondisplaced fracture of the right superior pubic ramus at the   pubic symphyseal junction.    No evidence of acute fracture or dislocation of the right femur.   Unchanged appearance of fixation hardware within the right femur.

## 2024-03-15 ENCOUNTER — TRANSCRIPTION ENCOUNTER (OUTPATIENT)
Age: 79
End: 2024-03-15

## 2024-03-15 LAB
ANION GAP SERPL CALC-SCNC: 6 MMOL/L — SIGNIFICANT CHANGE UP (ref 5–17)
BUN SERPL-MCNC: 11 MG/DL — SIGNIFICANT CHANGE UP (ref 7–23)
CALCIUM SERPL-MCNC: 9 MG/DL — SIGNIFICANT CHANGE UP (ref 8.5–10.1)
CHLORIDE SERPL-SCNC: 113 MMOL/L — HIGH (ref 96–108)
CO2 SERPL-SCNC: 21 MMOL/L — LOW (ref 22–31)
CREAT SERPL-MCNC: 0.49 MG/DL — LOW (ref 0.5–1.3)
EGFR: 95 ML/MIN/1.73M2 — SIGNIFICANT CHANGE UP
GLUCOSE SERPL-MCNC: 97 MG/DL — SIGNIFICANT CHANGE UP (ref 70–99)
HCT VFR BLD CALC: 39.7 % — SIGNIFICANT CHANGE UP (ref 34.5–45)
HGB BLD-MCNC: 13.5 G/DL — SIGNIFICANT CHANGE UP (ref 11.5–15.5)
MCHC RBC-ENTMCNC: 30.8 PG — SIGNIFICANT CHANGE UP (ref 27–34)
MCHC RBC-ENTMCNC: 34 GM/DL — SIGNIFICANT CHANGE UP (ref 32–36)
MCV RBC AUTO: 90.6 FL — SIGNIFICANT CHANGE UP (ref 80–100)
PLATELET # BLD AUTO: 271 K/UL — SIGNIFICANT CHANGE UP (ref 150–400)
POTASSIUM SERPL-MCNC: 3.9 MMOL/L — SIGNIFICANT CHANGE UP (ref 3.5–5.3)
POTASSIUM SERPL-SCNC: 3.9 MMOL/L — SIGNIFICANT CHANGE UP (ref 3.5–5.3)
RBC # BLD: 4.38 M/UL — SIGNIFICANT CHANGE UP (ref 3.8–5.2)
RBC # FLD: 13.5 % — SIGNIFICANT CHANGE UP (ref 10.3–14.5)
SODIUM SERPL-SCNC: 140 MMOL/L — SIGNIFICANT CHANGE UP (ref 135–145)
WBC # BLD: 8.04 K/UL — SIGNIFICANT CHANGE UP (ref 3.8–10.5)
WBC # FLD AUTO: 8.04 K/UL — SIGNIFICANT CHANGE UP (ref 3.8–10.5)

## 2024-03-15 PROCEDURE — 99232 SBSQ HOSP IP/OBS MODERATE 35: CPT

## 2024-03-15 RX ORDER — QUETIAPINE FUMARATE 200 MG/1
1 TABLET, FILM COATED ORAL
Refills: 0 | DISCHARGE

## 2024-03-15 RX ORDER — ATORVASTATIN CALCIUM 80 MG/1
1 TABLET, FILM COATED ORAL
Qty: 0 | Refills: 0 | DISCHARGE
Start: 2024-03-15

## 2024-03-15 RX ORDER — OXYCODONE HYDROCHLORIDE 5 MG/1
1 TABLET ORAL
Qty: 0 | Refills: 0 | DISCHARGE
Start: 2024-03-15

## 2024-03-15 RX ORDER — QUETIAPINE FUMARATE 200 MG/1
1 TABLET, FILM COATED ORAL
Qty: 0 | Refills: 0 | DISCHARGE
Start: 2024-03-15

## 2024-03-15 RX ORDER — ATORVASTATIN CALCIUM 80 MG/1
1 TABLET, FILM COATED ORAL
Refills: 0 | DISCHARGE

## 2024-03-15 RX ORDER — ACETAMINOPHEN 500 MG
2 TABLET ORAL
Qty: 0 | Refills: 0 | DISCHARGE
Start: 2024-03-15

## 2024-03-15 RX ORDER — LOSARTAN POTASSIUM 100 MG/1
50 TABLET, FILM COATED ORAL DAILY
Refills: 0 | Status: DISCONTINUED | OUTPATIENT
Start: 2024-03-15 | End: 2024-03-18

## 2024-03-15 RX ORDER — LOSARTAN POTASSIUM 100 MG/1
1 TABLET, FILM COATED ORAL
Qty: 0 | Refills: 0 | DISCHARGE
Start: 2024-03-15

## 2024-03-15 RX ADMIN — DEXTROSE MONOHYDRATE, SODIUM CHLORIDE, AND POTASSIUM CHLORIDE 75 MILLILITER(S): 50; .745; 4.5 INJECTION, SOLUTION INTRAVENOUS at 03:13

## 2024-03-15 RX ADMIN — QUETIAPINE FUMARATE 25 MILLIGRAM(S): 200 TABLET, FILM COATED ORAL at 23:28

## 2024-03-15 RX ADMIN — HEPARIN SODIUM 5000 UNIT(S): 5000 INJECTION INTRAVENOUS; SUBCUTANEOUS at 21:57

## 2024-03-15 RX ADMIN — CEFTRIAXONE 1000 MILLIGRAM(S): 500 INJECTION, POWDER, FOR SOLUTION INTRAMUSCULAR; INTRAVENOUS at 21:57

## 2024-03-15 RX ADMIN — HEPARIN SODIUM 5000 UNIT(S): 5000 INJECTION INTRAVENOUS; SUBCUTANEOUS at 09:26

## 2024-03-15 RX ADMIN — PANTOPRAZOLE SODIUM 40 MILLIGRAM(S): 20 TABLET, DELAYED RELEASE ORAL at 05:32

## 2024-03-15 RX ADMIN — ATORVASTATIN CALCIUM 40 MILLIGRAM(S): 80 TABLET, FILM COATED ORAL at 21:57

## 2024-03-15 RX ADMIN — DULOXETINE HYDROCHLORIDE 30 MILLIGRAM(S): 30 CAPSULE, DELAYED RELEASE ORAL at 09:28

## 2024-03-15 RX ADMIN — LOSARTAN POTASSIUM 50 MILLIGRAM(S): 100 TABLET, FILM COATED ORAL at 09:30

## 2024-03-15 NOTE — DISCHARGE NOTE PROVIDER - HOSPITAL COURSE
78 y/o F w/ PMH of dementia, dyslipidemia, PUD, p/w mechanical fall. Patient unable to provide history 2/2 dementia. As per chart, patient's  had reported that patient tripped and fell in the garage and c/o pain in R hip / groin area. Workup showed R superior pubic ramus fracture. CT head with no acute findings. Ortho consulted and no acute surgical intervention.    3/15- patient was seen and examined. Oriented x 2. Denies fever, pain or chills. VSS.       ROS:   All 10 systems reviewed and found to be negative with the exception of what has been described above.     Vital Signs Last 24 Hrs  T(C): 36.5 (15 Mar 2024 08:00), Max: 37 (14 Mar 2024 20:03)  T(F): 97.7 (15 Mar 2024 08:00), Max: 98.6 (14 Mar 2024 20:03)  HR: 79 (15 Mar 2024 08:00) (79 - 100)  BP: 170/80 (15 Mar 2024 08:00) (146/59 - 178/76)  BP(mean): 113 (14 Mar 2024 20:03) (100 - 113)  RR: 18 (15 Mar 2024 08:00) (16 - 18)  SpO2: 97% (15 Mar 2024 08:00) (96% - 99%)    Parameters below as of 15 Mar 2024 08:00  Patient On (Oxygen Delivery Method): room air    PE:  Constitutional: NAD, laying in bed- frail, elderly.   HEENT: NC/AT  Back: no tenderness  Respiratory: respirations even and non labored, LCTA- diminished at the base   Cardiovascular: S1S2 regular, no murmurs  Abdomen: soft, not tender, not distended, positive BS  Genitourinary: voiding  Musculoskeletal: no muscle tenderness, no joint swelling or tenderness  Extremities: no pedal edema   Neurological: no focal deficits    medications/labs- reviewed      PLAN   *R superior pubic ramus fracture s/p mechanical fall  - CT head no acute findings, Small chronic right basal ganglia lacunar infarcts.  - XR/CT LE: acute pubic rimi FX, no  acute  femoral Fx  - Pain control  - fall precautions   - Patient evaluated by ortho  and recommends: WBAT, PT, ice PRN  - check orthostatic VS - negative     * Abnormal UA, suspected UTI  - C/w  IV Ceftriaxone- transition to po on dc - complete 2 more days   - urine culture negative - but it appears that urine culture was sent after patient received first dose of antibiotic     * Hypertensive urgency   no H/o HTN  losartan     * Hypokalemia/Hypomagnesemia  replete and monitor    * Dysphagia   speech and swallow consulted  cleared for thin liquids and soft and bite size       *H/o dementia, also suspect metabolic encephalopathy in settings of infection   supportive care  C/w Duloxetine and Seroquel     PUD   C/w PPI     Dark BM  - stable H/H    *DVT ppx   -Heparin SubQ.    Plan for dc to rehab when bed is available.          78 y/o F w/ PMH of dementia, dyslipidemia, PUD, p/w mechanical fall. Patient unable to provide history 2/2 dementia. As per chart, patient's  had reported that patient tripped and fell in the garage and c/o pain in R hip / groin area. Workup showed R superior pubic ramus fracture. CT head with no acute findings. Ortho consulted and no acute surgical intervention.    T(F): 98.2 (03-16-24 @ 08:31), Max: 98.2 (03-16-24 @ 08:31)  HR: 82 (03-16-24 @ 08:31) (82 - 89)  BP: 142/84 (03-16-24 @ 08:31) (142/84 - 159/85)  RR: 18 (03-16-24 @ 08:31) (18 - 18)  SpO2: 96% (03-16-24 @ 08:31) (95% - 97%)    HEENT:  pupils equal and reactive, EOMI, no oropharyngeal lesions, erythema, exudates, oral thrush  NECK:   supple, no carotid bruits, no palpable lymph nodes, no thyromegaly  CV:  +S1, +S2, regular, no murmurs  RESP:   lungs clear to auscultation bilaterally, no wheezing, rales, rhonchi, good air entry bilaterally  GI:  abdomen soft, non-tender, non-distended, normal BS  EXT:  no clubbing, no cyanosis, no edema, no calf pain, swelling or erythema  NEURO:  Awake, pleasant, no focal neurological deficits, follows all commands, able to move extremities spontaneously  SKIN:  no ulcers, lesions or rashes                        13.5   8.04  )-----------( 271      ( 15 Mar 2024 08:15 )             39.7     03-15    140  |  113<H>  |  11  ----------------------------<  97  3.9   |  21<L>  |  0.49<L>    Ca    9.0      15 Mar 2024 08:15    PLAN   *R superior pubic ramus fracture s/p mechanical fall  - CT head no acute findings, Small chronic right basal ganglia lacunar infarcts.  - XR/CT LE: acute pubic rimi FX, no  acute  femoral Fx  - Pain control  - fall precautions   - Patient evaluated by ortho  and recommends: WBAT, PT, ice PRN  - check orthostatic VS - negative     * Abnormal UA, suspected UTI  - C/w  IV Ceftriaxone- transition to po PO Ceftin 500 daily X 3 more days  - urine culture negative - but it appears that urine culture was sent after patient received first dose of antibiotic     * Hypertensive urgency   no H/o HTN  losartan     * Hypokalemia/Hypomagnesemia  replete and monitor    * Dysphagia   speech and swallow consulted  cleared for thin liquids and soft and bite size     *H/o dementia, also suspect metabolic encephalopathy in settings of infection   supportive care  C/w Duloxetine and Seroquel     PUD   C/w PPI     Dispo:  medically stable for d/c to rehab today     79 F with Hx of dementia, dyslipidemia, PUD, was admitted with a mechanical fall and acute right superior pubic ramus fracture.    *Right superior pubic ramus fracture, s/p mechanical fall  - CT head no acute findings, Small chronic right basal ganglia lacunar infarcts.  - XR/CT LE:  acute right superior pubic ramus fracture, no acute femoral Fx  - Pain control  - fall precautions   - Patient evaluated by ortho and recommends:  WBAT, PT, ice PRN  - orthostatics negative    * Abnormal UA, suspected UTI with elevated lactate on admission  -  lactate normalized, blood cultures and urine culture negative  -  s/p 3 days of IV Rocephin  -  afebrile  -  WBC now normalized    * Hypertensive urgency   -  new onset HTN  -  BP improved on Losartan    * Dysphagia   - s/p speech and swallow consult, cleared for thin liquids and soft and bite size     *H/o dementia, also suspect metabolic encephalopathy in the setting of infection   - resolved  - continue duloxetine and seroquel    *Hx of PUD   - continue PPI    *DVT ppx   -Heparin SubQ and venodynes    Dispo:  medically stable for d/c, awaiting bed availability at SNF   79 F with Hx of dementia, dyslipidemia, PUD, was admitted with a mechanical fall and acute right superior pubic ramus fracture.    *Right superior pubic ramus fracture, s/p mechanical fall  - CT head no acute findings, Small chronic right basal ganglia lacunar infarcts.  - XR/CT LE:  acute right superior pubic ramus fracture, no acute femoral Fx  - Pain control  - fall precautions   - Patient evaluated by ortho and recommends:  WBAT, PT, ice PRN  - orthostatics negative    * Abnormal UA, suspected UTI with elevated lactate on admission  -  lactate normalized, blood cultures and urine cultures negative  -  s/p 3 days of IV Rocephin  -  afebrile  -  WBC normal    * New Onset HTN  -  BP improved on Losartan    * Dysphagia   - s/p speech and swallow consult, cleared for thin liquids and soft and bite size     *H/o dementia, also suspect metabolic encephalopathy in the setting of infection   - resolved and stable  - calm and cooperative  - continue duloxetine and seroquel    *Hx of PUD   - continue PPI    *DVT ppx   - Lovenox and venodynes

## 2024-03-15 NOTE — DISCHARGE NOTE PROVIDER - NSDCMRMEDTOKEN_GEN_ALL_CORE_FT
acetaminophen 325 mg oral tablet: 2 tab(s) orally every 6 hours As needed Temp greater or equal to 38C (100.4F), Mild Pain (1 - 3)  atorvastatin 40 mg oral tablet: 1 tab(s) orally once a day (at bedtime)  Ceftin 500 mg oral tablet: 1 tab(s) orally 2 times a day x3 more day  of antibiotic  DULoxetine 30 mg oral delayed release capsule: 1 cap(s) orally once a day  losartan 50 mg oral tablet: 1 tab(s) orally once a day  oxyCODONE 5 mg oral tablet: 1 tab(s) orally every 6 hours as needed for Moderate Pain (4 - 6)  pantoprazole 40 mg oral delayed release tablet: 1 tab(s) orally once a day  QUEtiapine 25 mg oral tablet: 1 tab(s) orally once a day (at bedtime) As needed for agitation   acetaminophen 325 mg oral tablet: 2 tab(s) orally every 6 hours As needed Temp greater or equal to 38C (100.4F), Mild Pain (1 - 3)  atorvastatin 40 mg oral tablet: 1 tab(s) orally once a day (at bedtime)  Ceftin 500 mg oral tablet: 1 tab(s) orally once a day x 3 more days  DULoxetine 30 mg oral delayed release capsule: 1 cap(s) orally once a day  losartan 50 mg oral tablet: 1 tab(s) orally once a day  oxyCODONE 5 mg oral tablet: 1 tab(s) orally every 6 hours as needed for Moderate Pain (4 - 6)  pantoprazole 40 mg oral delayed release tablet: 1 tab(s) orally once a day  QUEtiapine 25 mg oral tablet: 1 tab(s) orally once a day (at bedtime)   acetaminophen 325 mg oral tablet: 2 tab(s) orally every 6 hours As needed Temp greater or equal to 38C (100.4F), Mild Pain (1 - 3)  atorvastatin 40 mg oral tablet: 1 tab(s) orally once a day (at bedtime)  DULoxetine 30 mg oral delayed release capsule: 1 cap(s) orally once a day  losartan 50 mg oral tablet: 1 tab(s) orally once a day  oxyCODONE 5 mg oral tablet: 1 tab(s) orally every 6 hours as needed for Moderate Pain (4 - 6)  pantoprazole 40 mg oral delayed release tablet: 1 tab(s) orally once a day  QUEtiapine 25 mg oral tablet: 1 tab(s) orally once a day (at bedtime)   acetaminophen 325 mg oral tablet: 2 tab(s) orally every 6 hours As needed Temp greater or equal to 38C (100.4F), Mild Pain (1 - 3)  atorvastatin 40 mg oral tablet: 1 tab(s) orally once a day (at bedtime)  DULoxetine 30 mg oral delayed release capsule: 1 cap(s) orally once a day  losartan 50 mg oral tablet: 1 tab(s) orally once a day  Lovenox 40 mg/0.4 mL injectable solution: 40 milligram(s) subcutaneously once a day (at bedtime) DVT prophylaxis  oxyCODONE 5 mg oral tablet: 1 tab(s) orally every 6 hours as needed for Moderate Pain (4 - 6)  pantoprazole 40 mg oral delayed release tablet: 1 tab(s) orally once a day  QUEtiapine 25 mg oral tablet: 1 tab(s) orally once a day (at bedtime)

## 2024-03-15 NOTE — DISCHARGE NOTE PROVIDER - CARE PROVIDER_API CALL
primary care physician in the community,   after dc from rehab  Phone: (   )    -  Fax: (   )    -  Follow Up Time:     Carmen Blank  Family Medicine  [address] [place] [id number]  Phone: [phone]  Fax: [phone]  Follow Up Time:

## 2024-03-15 NOTE — DISCHARGE NOTE PROVIDER - PROVIDER TOKENS
FREE:[LAST:[primary care physician in the community],PHONE:[(   )    -],FAX:[(   )    -],ADDRESS:[after dc from rehab]],PROVIDER:[TOKEN:[[id number]]]

## 2024-03-15 NOTE — DISCHARGE NOTE PROVIDER - NSDCCAREPROVSEEN_GEN_ALL_CORE_FT
Jose, Dianna Patino, Federico Maier, Abram Shelton, Issac Peralta, Ron Dukes, Annabelle Treadwell, Vero Montalvo, Cong RAGLAND

## 2024-03-15 NOTE — PROGRESS NOTE ADULT - SUBJECTIVE AND OBJECTIVE BOX
80 y/o F w/ PMH of dementia, dyslipidemia, PUD, p/w mechanical fall. Patient unable to provide history 2/2 dementia. As per chart, patient's  had reported that patient tripped and fell in the garage and c/o pain in R hip / groin area. Workup showed R superior pubic ramus fracture. CT head with no acute findings. Ortho consulted and no acute surgical intervention.    3/15- patient was seen and examined. Oriented x 2. Denies fever, pain or chills. VSS.       ROS:   All 10 systems reviewed and found to be negative with the exception of what has been described above.     Vital Signs Last 24 Hrs  T(C): 36.5 (15 Mar 2024 08:00), Max: 37 (14 Mar 2024 20:03)  T(F): 97.7 (15 Mar 2024 08:00), Max: 98.6 (14 Mar 2024 20:03)  HR: 79 (15 Mar 2024 08:00) (79 - 100)  BP: 170/80 (15 Mar 2024 08:00) (146/59 - 178/76)  BP(mean): 113 (14 Mar 2024 20:03) (100 - 113)  RR: 18 (15 Mar 2024 08:00) (16 - 18)  SpO2: 97% (15 Mar 2024 08:00) (96% - 99%)    Parameters below as of 15 Mar 2024 08:00  Patient On (Oxygen Delivery Method): room air    PE:  Constitutional: NAD, laying in bed- frail, elderly.   HEENT: NC/AT  Back: no tenderness  Respiratory: respirations even and non labored, LCTA- diminished at the base   Cardiovascular: S1S2 regular, no murmurs  Abdomen: soft, not tender, not distended, positive BS  Genitourinary: voiding  Musculoskeletal: no muscle tenderness, no joint swelling or tenderness  Extremities: no pedal edema   Neurological: no focal deficits    MEDICATIONS  (STANDING):  atorvastatin 40 milliGRAM(s) Oral at bedtime  cefTRIAXone Injectable. 1000 milliGRAM(s) IV Push every 24 hours  DULoxetine 30 milliGRAM(s) Oral daily  heparin   Injectable 5000 Unit(s) SubCutaneous every 12 hours  losartan 50 milliGRAM(s) Oral daily  pantoprazole    Tablet 40 milliGRAM(s) Oral before breakfast  sodium chloride 0.9% with potassium chloride 20 mEq/L 1000 milliLiter(s) (75 mL/Hr) IV Continuous <Continuous>    MEDICATIONS  (PRN):  acetaminophen     Tablet .. 650 milliGRAM(s) Oral every 6 hours PRN Temp greater or equal to 38C (100.4F), Mild Pain (1 - 3)  hydrALAZINE Injectable 10 milliGRAM(s) IV Push every 6 hours PRN SBP>160, DBP>90  ondansetron Injectable 4 milliGRAM(s) IV Push once PRN Nausea and/or Vomiting  oxyCODONE    IR 5 milliGRAM(s) Oral every 4 hours PRN Moderate Pain (4 - 6)  QUEtiapine 25 milliGRAM(s) Oral at bedtime PRN for agitation      03-15    140  |  113<H>  |  11  ----------------------------<  97  3.9   |  21<L>  |  0.49<L>    Ca    9.0      15 Mar 2024 08:15  Mg     2.2     03-14                          13.5   8.04  )-----------( 271      ( 15 Mar 2024 08:15 )             39.7

## 2024-03-15 NOTE — PROGRESS NOTE ADULT - ASSESSMENT
PLAN   *R superior pubic ramus fracture s/p mechanical fall  - CT head no acute findings, Small chronic right basal ganglia lacunar infarcts.  - XR/CT LE: acute pubic rimi FX, no  acute  femoral Fx  - Pain control  - fall precautions   - Patient evaluated by ortho  and recommends: WBAT, PT, ice PRN  - check orthostatic VS - negative     * Abnormal UA, suspected UTI  - C/w  IV Ceftriaxone- transition to po on dc - complete 2 more days   - urine culture negative - but it appears that urine culture was sent after patient received first dose of antibiotic     * Hypertensive urgency   no H/o HTN  losartan   monitor     * Hypokalemia/Hypomagnesemia  replete and monitor    * Dysphagia   speech and swallow consulted  cleared for thin liquids and soft and bite size       *H/o dementia, also suspect metabolic encephalopathy in settings of infection   supportive care  C/w Duloxetine and Seroquel     PUD   C/w PPI     Dark BM- no further BM  - stable H/H    *DVT ppx   -Heparin SubQ.    Plan for dc to rehab when bed is available.

## 2024-03-15 NOTE — DISCHARGE NOTE PROVIDER - NSDCQMERRANDS_GEN_ALL_CORE
Monitor:The patient's pulmonary condition is unchanged.  Evaluation: stop smoking  Assessment/Treatment:  Continue current treatment/monitoring regimen.  Condition will be reassessed in 3 months   Yes

## 2024-03-15 NOTE — DISCHARGE NOTE PROVIDER - NSDCCPCAREPLAN_GEN_ALL_CORE_FT
PRINCIPAL DISCHARGE DIAGNOSIS  Diagnosis: Fall with injury  Assessment and Plan of Treatment: imaging showed Acute pubic rami fracture- no orthopedic surgical intervention - conservative management  f/u with ortho as an outpatient         SECONDARY DISCHARGE DIAGNOSES  Diagnosis: Injury of right hip, initial encounter  Assessment and Plan of Treatment: no ortho surgical intervention    Diagnosis: Dementia  Assessment and Plan of Treatment: supportive care    Diagnosis: Bacterial UTI  Assessment and Plan of Treatment: complete antibiotic regimen as prescribed. Complete the full course of antibiotics- do not skip or miss doses- do not stop even when you start to feel better.   Notify your PCP if your symptom has worsened or if you develop excessive diarrhea while on antibiotic.    Diagnosis: Hypertension  Assessment and Plan of Treatment: take your medications exactly as prescribed  you were started on gabe BP meds  take your BP at home and keep a log bring this with you on your next appointment.

## 2024-03-15 NOTE — DISCHARGE NOTE PROVIDER - CARE PROVIDERS DIRECT ADDRESSES
,DirectAddress_Unknown,[email or web address]

## 2024-03-16 PROCEDURE — 99232 SBSQ HOSP IP/OBS MODERATE 35: CPT

## 2024-03-16 RX ORDER — QUETIAPINE FUMARATE 200 MG/1
25 TABLET, FILM COATED ORAL AT BEDTIME
Refills: 0 | Status: DISCONTINUED | OUTPATIENT
Start: 2024-03-16 | End: 2024-03-18

## 2024-03-16 RX ORDER — CEFUROXIME AXETIL 250 MG
1 TABLET ORAL
Qty: 0 | Refills: 0 | DISCHARGE

## 2024-03-16 RX ADMIN — HEPARIN SODIUM 5000 UNIT(S): 5000 INJECTION INTRAVENOUS; SUBCUTANEOUS at 22:30

## 2024-03-16 RX ADMIN — QUETIAPINE FUMARATE 25 MILLIGRAM(S): 200 TABLET, FILM COATED ORAL at 22:30

## 2024-03-16 RX ADMIN — LOSARTAN POTASSIUM 50 MILLIGRAM(S): 100 TABLET, FILM COATED ORAL at 10:10

## 2024-03-16 RX ADMIN — DULOXETINE HYDROCHLORIDE 30 MILLIGRAM(S): 30 CAPSULE, DELAYED RELEASE ORAL at 10:11

## 2024-03-16 RX ADMIN — ATORVASTATIN CALCIUM 40 MILLIGRAM(S): 80 TABLET, FILM COATED ORAL at 22:30

## 2024-03-16 RX ADMIN — HEPARIN SODIUM 5000 UNIT(S): 5000 INJECTION INTRAVENOUS; SUBCUTANEOUS at 10:10

## 2024-03-16 NOTE — PROGRESS NOTE ADULT - ASSESSMENT
79 F with Hx of dementia, dyslipidemia, PUD, was admitted with a mechanical fall and acute right superior pubic ramus fracture.    *Right superior pubic ramus fracture, s/p mechanical fall  - CT head no acute findings, Small chronic right basal ganglia lacunar infarcts.  - XR/CT LE:  acute right superior pubic ramus fracture, no acute femoral Fx  - Pain control  - fall precautions   - Patient evaluated by ortho and recommends:  WBAT, PT, ice PRN  - orthostatics negative    * Abnormal UA, suspected UTI with elevated lactate on admission  -  lactate normalized, blood cultures and urine culture negative  -  s/p 3 days of IV Rocephin  -  afebrile  -  WBC now normalized    * Hypertensive urgency   -  new onset HTN  -  BP improved on Losartan    * Dysphagia   - s/p speech and swallow consult, cleared for thin liquids and soft and bite size     *H/o dementia, also suspect metabolic encephalopathy in the setting of infection   - resolved  - continue duloxetine and seroquel    *Hx of PUD   - continue PPI    *DVT ppx   -Heparin SubQ and venodynes    Dispo:  medically stable for d/c, awaiting bed availability at SNF

## 2024-03-16 NOTE — PROGRESS NOTE ADULT - SUBJECTIVE AND OBJECTIVE BOX
Chart and meds reviewed.  Patient seen and examined.    3/16 - no events overnight, no complaints, waiting to go to rehab    All other systems reviewed and found to be negative with the exception of what has been described above.    MEDICATIONS  (STANDING):  atorvastatin 40 milliGRAM(s) Oral at bedtime  DULoxetine 30 milliGRAM(s) Oral daily  heparin   Injectable 5000 Unit(s) SubCutaneous every 12 hours  losartan 50 milliGRAM(s) Oral daily  pantoprazole    Tablet 40 milliGRAM(s) Oral before breakfast  QUEtiapine 25 milliGRAM(s) Oral at bedtime  sodium chloride 0.9% with potassium chloride 20 mEq/L 1000 milliLiter(s) (75 mL/Hr) IV Continuous <Continuous>    MEDICATIONS  (PRN):  acetaminophen     Tablet .. 650 milliGRAM(s) Oral every 6 hours PRN Temp greater or equal to 38C (100.4F), Mild Pain (1 - 3)  hydrALAZINE Injectable 10 milliGRAM(s) IV Push every 6 hours PRN SBP>160, DBP>90  ondansetron Injectable 4 milliGRAM(s) IV Push once PRN Nausea and/or Vomiting  oxyCODONE    IR 5 milliGRAM(s) Oral every 4 hours PRN Moderate Pain (4 - 6)    VITAL SIGNS:  T(F): 98.2 (03-16-24 @ 08:31), Max: 98.2 (03-16-24 @ 08:31)  HR: 82 (03-16-24 @ 08:31) (82 - 89)  BP: 142/84 (03-16-24 @ 08:31) (142/84 - 159/85)  RR: 18 (03-16-24 @ 08:31) (18 - 18)  SpO2: 96% (03-16-24 @ 08:31) (95% - 97%)    PHYSICAL EXAM:  HEENT:  pupils equal and reactive, EOMI, no oropharyngeal lesions, erythema, exudates, oral thrush  NECK:   supple, no carotid bruits, no palpable lymph nodes, no thyromegaly  CV:  +S1, +S2, regular, no murmurs  RESP:   lungs clear to auscultation bilaterally, no wheezing, rales, rhonchi, good air entry bilaterally  GI:  abdomen soft, non-tender, non-distended, normal BS  EXT:  no clubbing, no cyanosis, no edema, no calf pain, swelling or erythema  NEURO:  Awake, confused, no focal neurological deficits, follows all commands, able to move extremities spontaneously  SKIN:  no ulcers, lesions or rashes    LABS:                        13.5   8.04  )-----------( 271      ( 15 Mar 2024 08:15 )             39.7     03-15    140  |  113<H>  |  11  ----------------------------<  97  3.9   |  21<L>  |  0.49<L>    Ca    9.0      15 Mar 2024 08:15

## 2024-03-17 PROCEDURE — 99232 SBSQ HOSP IP/OBS MODERATE 35: CPT

## 2024-03-17 RX ADMIN — Medication 10 MILLIGRAM(S): at 09:25

## 2024-03-17 RX ADMIN — DULOXETINE HYDROCHLORIDE 30 MILLIGRAM(S): 30 CAPSULE, DELAYED RELEASE ORAL at 09:25

## 2024-03-17 RX ADMIN — PANTOPRAZOLE SODIUM 40 MILLIGRAM(S): 20 TABLET, DELAYED RELEASE ORAL at 09:29

## 2024-03-17 RX ADMIN — LOSARTAN POTASSIUM 50 MILLIGRAM(S): 100 TABLET, FILM COATED ORAL at 09:28

## 2024-03-17 RX ADMIN — HEPARIN SODIUM 5000 UNIT(S): 5000 INJECTION INTRAVENOUS; SUBCUTANEOUS at 22:02

## 2024-03-17 RX ADMIN — HEPARIN SODIUM 5000 UNIT(S): 5000 INJECTION INTRAVENOUS; SUBCUTANEOUS at 09:28

## 2024-03-17 RX ADMIN — ATORVASTATIN CALCIUM 40 MILLIGRAM(S): 80 TABLET, FILM COATED ORAL at 22:02

## 2024-03-17 RX ADMIN — QUETIAPINE FUMARATE 25 MILLIGRAM(S): 200 TABLET, FILM COATED ORAL at 22:02

## 2024-03-17 NOTE — PROGRESS NOTE ADULT - SUBJECTIVE AND OBJECTIVE BOX
Chart and meds reviewed.  Patient seen and examined.    3/17 - no events overnight, no complaints at this time    All other systems reviewed and found to be negative with the exception of what has been described above.    MEDICATIONS  (STANDING):  atorvastatin 40 milliGRAM(s) Oral at bedtime  DULoxetine 30 milliGRAM(s) Oral daily  heparin   Injectable 5000 Unit(s) SubCutaneous every 12 hours  losartan 50 milliGRAM(s) Oral daily  pantoprazole    Tablet 40 milliGRAM(s) Oral before breakfast  QUEtiapine 25 milliGRAM(s) Oral at bedtime  sodium chloride 0.9% with potassium chloride 20 mEq/L 1000 milliLiter(s) (75 mL/Hr) IV Continuous <Continuous>    MEDICATIONS  (PRN):  acetaminophen     Tablet .. 650 milliGRAM(s) Oral every 6 hours PRN Temp greater or equal to 38C (100.4F), Mild Pain (1 - 3)  hydrALAZINE Injectable 10 milliGRAM(s) IV Push every 6 hours PRN SBP>160, DBP>90  ondansetron Injectable 4 milliGRAM(s) IV Push once PRN Nausea and/or Vomiting  oxyCODONE    IR 5 milliGRAM(s) Oral every 4 hours PRN Moderate Pain (4 - 6)    VITAL SIGNS:  T(F): 97.6 (03-17-24 @ 09:00), Max: 98.1 (03-17-24 @ 00:00)  HR: 76 (03-17-24 @ 09:00) (76 - 95)  BP: 163/75 (03-17-24 @ 09:00) (135/72 - 163/75)  RR: 18 (03-17-24 @ 09:00) (18 - 18)  SpO2: 97% (03-17-24 @ 09:00) (95% - 97%)    PHYSICAL EXAM:  HEENT:  pupils equal and reactive, EOMI, no oropharyngeal lesions, erythema, exudates, oral thrush  NECK:   supple, no carotid bruits, no palpable lymph nodes, no thyromegaly  CV:  +S1, +S2, regular, no murmurs  RESP:   lungs clear to auscultation bilaterally, no wheezing, rales, rhonchi, good air entry bilaterally  GI:  abdomen soft, non-tender, non-distended, normal BS  EXT:  no clubbing, no cyanosis, no edema, no calf pain, swelling or erythema  NEURO:  sleeping now but arousable, no focal neurological deficits, follows all commands, able to move extremities spontaneously  SKIN:  no ulcers, lesions or rashes    LABS:

## 2024-03-17 NOTE — PROGRESS NOTE ADULT - ASSESSMENT
79 F with Hx of dementia, dyslipidemia, PUD, was admitted with a mechanical fall and acute right superior pubic ramus fracture.    *Right superior pubic ramus fracture, s/p mechanical fall  - CT head no acute findings, Small chronic right basal ganglia lacunar infarcts.  - XR/CT LE:  acute right superior pubic ramus fracture, no acute femoral Fx  - Pain control  - fall precautions   - Patient evaluated by ortho and recommends:  WBAT, PT, ice PRN  - orthostatics negative    * Abnormal UA, suspected UTI with elevated lactate on admission  -  lactate normalized, blood cultures and urine cultures negative  -  s/p 3 days of IV Rocephin  -  afebrile  -  WBC normal    * New Onset HTN  -  BP improved on Losartan    * Dysphagia   - s/p speech and swallow consult, cleared for thin liquids and soft and bite size     *H/o dementia, also suspect metabolic encephalopathy in the setting of infection   - resolved  - continue duloxetine and seroquel    *Hx of PUD   - continue PPI    *DVT ppx   - Lovenox and venodynes    Dispo:  was supposed to go [facility] but  has now changed his mind and now wants [facility], medically stable for d/c to [facility] tomorrow if bed is available   79 F with Hx of dementia, dyslipidemia, PUD, was admitted with a mechanical fall and acute right superior pubic ramus fracture.    *Right superior pubic ramus fracture, s/p mechanical fall  - CT head no acute findings, Small chronic right basal ganglia lacunar infarcts.  - XR/CT LE:  acute right superior pubic ramus fracture, no acute femoral Fx  - Pain control  - fall precautions   - Patient evaluated by ortho and recommends:  WBAT, PT, ice PRN  - orthostatics negative    * Abnormal UA, suspected UTI with elevated lactate on admission  -  lactate normalized, blood cultures and urine cultures negative  -  s/p 3 days of IV Rocephin  -  afebrile  -  WBC normal    * New Onset HTN  -  BP improved on Losartan    * Dysphagia   - s/p speech and swallow consult, cleared for thin liquids and soft and bite size     *H/o dementia, also suspect metabolic encephalopathy in the setting of infection   - resolved  - calm and cooperative  - continue duloxetine and seroquel    *Hx of PUD   - continue PPI    *DVT ppx   - Lovenox and venodynes    Dispo:  was supposed to go [facility] but  has now changed his mind and now wants [facility], medically stable for d/c to [facility] tomorrow if bed is available

## 2024-03-18 ENCOUNTER — TRANSCRIPTION ENCOUNTER (OUTPATIENT)
Age: 79
End: 2024-03-18

## 2024-03-18 VITALS
SYSTOLIC BLOOD PRESSURE: 150 MMHG | OXYGEN SATURATION: 98 % | HEART RATE: 81 BPM | TEMPERATURE: 97 F | DIASTOLIC BLOOD PRESSURE: 73 MMHG | RESPIRATION RATE: 18 BRPM

## 2024-03-18 LAB
CULTURE RESULTS: SIGNIFICANT CHANGE UP
CULTURE RESULTS: SIGNIFICANT CHANGE UP
SPECIMEN SOURCE: SIGNIFICANT CHANGE UP
SPECIMEN SOURCE: SIGNIFICANT CHANGE UP

## 2024-03-18 PROCEDURE — 99239 HOSP IP/OBS DSCHRG MGMT >30: CPT

## 2024-03-18 RX ORDER — ENOXAPARIN SODIUM 100 MG/ML
40 INJECTION SUBCUTANEOUS
Qty: 0 | Refills: 0 | DISCHARGE

## 2024-03-18 RX ADMIN — LOSARTAN POTASSIUM 50 MILLIGRAM(S): 100 TABLET, FILM COATED ORAL at 10:18

## 2024-03-18 RX ADMIN — HEPARIN SODIUM 5000 UNIT(S): 5000 INJECTION INTRAVENOUS; SUBCUTANEOUS at 10:17

## 2024-03-18 RX ADMIN — DULOXETINE HYDROCHLORIDE 30 MILLIGRAM(S): 30 CAPSULE, DELAYED RELEASE ORAL at 10:18

## 2024-03-18 NOTE — DISCHARGE NOTE NURSING/CASE MANAGEMENT/SOCIAL WORK - NSCORESITESY/N_GEN_A_CORE_RD
Yes
overnight provider signed patient out to me this am   31 year old M here w/ a sung-rectal abscess w/ associated R buttock cellulitis, sp abscess I&D, sent to CDU for IV abx, pain control and surgical eval. This morning patient reports pain has improved, and less swelling. states he was able to have a bowel movement w/ less pain. Pt removed packing, as instructed by surgery, prior to having a bowel movement. Has been having some bleeding from the area, no purulence. Afebrile. Area is reddened, indurated with no fluctuance. pt pending surg reevaluation today.

## 2024-03-18 NOTE — PROGRESS NOTE ADULT - ASSESSMENT
79 F with Hx of dementia, dyslipidemia, PUD, was admitted with a mechanical fall and acute right superior pubic ramus fracture.    *Right superior pubic ramus fracture, s/p mechanical fall  - CT head no acute findings, Small chronic right basal ganglia lacunar infarcts.  - XR/CT LE:  acute right superior pubic ramus fracture, no acute femoral Fx  - Pain control  - fall precautions   - Patient evaluated by ortho and recommends:  WBAT, PT, ice PRN  - orthostatics negative    * Abnormal UA, suspected UTI with elevated lactate on admission  -  lactate normalized, blood cultures and urine cultures negative  -  s/p 3 days of IV Rocephin  -  afebrile  -  WBC normal    * New Onset HTN  -  BP improved on Losartan    * Dysphagia   - s/p speech and swallow consult, cleared for thin liquids and soft and bite size     *H/o dementia, also suspect metabolic encephalopathy in the setting of infection   - resolved  - calm and cooperative  - continue duloxetine and seroquel    *Hx of PUD   - continue PPI    *DVT ppx   - Lovenox and venodynes    Dispo:  was supposed to go [facility] but  has now changed his mind and now wants [facility], medically stable for d/c to [facility] tomorrow if bed is available

## 2024-03-18 NOTE — DISCHARGE NOTE NURSING/CASE MANAGEMENT/SOCIAL WORK - PATIENT PORTAL LINK FT
You can access the FollowMyHealth Patient Portal offered by [facility] by registering at the following website: [email or web address]. By joining [email or web address] FollowMyHealth portal, you will also be able to view your health information using other applications (apps) compatible with our system.

## 2024-03-18 NOTE — PROGRESS NOTE ADULT - SUBJECTIVE AND OBJECTIVE BOX
Chart and meds reviewed.  Patient seen and examined.    3/18- eating breakfast. oriented x1- no complaints     All other systems reviewed and found to be negative with the exception of what has been described above.    MEDICATIONS  (STANDING):  atorvastatin 40 milliGRAM(s) Oral at bedtime  DULoxetine 30 milliGRAM(s) Oral daily  heparin   Injectable 5000 Unit(s) SubCutaneous every 12 hours  losartan 50 milliGRAM(s) Oral daily  pantoprazole    Tablet 40 milliGRAM(s) Oral before breakfast  QUEtiapine 25 milliGRAM(s) Oral at bedtime  sodium chloride 0.9% with potassium chloride 20 mEq/L 1000 milliLiter(s) (75 mL/Hr) IV Continuous <Continuous>    MEDICATIONS  (PRN):  acetaminophen     Tablet .. 650 milliGRAM(s) Oral every 6 hours PRN Temp greater or equal to 38C (100.4F), Mild Pain (1 - 3)  hydrALAZINE Injectable 10 milliGRAM(s) IV Push every 6 hours PRN SBP>160, DBP>90  ondansetron Injectable 4 milliGRAM(s) IV Push once PRN Nausea and/or Vomiting  oxyCODONE    IR 5 milliGRAM(s) Oral every 4 hours PRN Moderate Pain (4 - 6)          Vital Signs Last 24 Hrs  T(C): 36.1 (18 Mar 2024 08:23), Max: 37.2 (17 Mar 2024 16:00)  T(F): 97 (18 Mar 2024 08:23), Max: 99 (17 Mar 2024 16:00)  HR: 81 (18 Mar 2024 08:23) (63 - 103)  BP: 150/73 (18 Mar 2024 08:23) (113/77 - 175/63)  BP(mean): --  RR: 18 (18 Mar 2024 08:23) (18 - 18)  SpO2: 98% (18 Mar 2024 08:23) (92% - 98%)    Parameters below as of 18 Mar 2024 08:23  Patient On (Oxygen Delivery Method): room air        PE:  Constitutional: NAD, laying in bed  HEENT: NC/AT  Back: no tenderness  Respiratory: respirations even and non labored, LCTA  Cardiovascular: S1S2 regular, no murmurs  Abdomen: soft, not tender, not distended, positive BS  Genitourinary: voiding  Musculoskeletal: no muscle tenderness, no joint swelling or tenderness  Extremities: no pedal edema   Neurological: no focal deficits    LABS: no new labs for review

## 2024-03-18 NOTE — DISCHARGE NOTE NURSING/CASE MANAGEMENT/SOCIAL WORK - NSDCPEFALRISK_GEN_ALL_CORE
For information on Fall & Injury Prevention, visit: [email or web address] OR  [email or web address] OR  https://www.cdc.gov/steadi/patient.html

## 2024-03-25 DIAGNOSIS — F03.90 UNSPECIFIED DEMENTIA, UNSPECIFIED SEVERITY, WITHOUT BEHAVIORAL DISTURBANCE, PSYCHOTIC DISTURBANCE, MOOD DISTURBANCE, AND ANXIETY: ICD-10-CM

## 2024-03-25 DIAGNOSIS — N39.0 URINARY TRACT INFECTION, SITE NOT SPECIFIED: ICD-10-CM

## 2024-03-25 DIAGNOSIS — E78.5 HYPERLIPIDEMIA, UNSPECIFIED: ICD-10-CM

## 2024-03-25 DIAGNOSIS — R13.10 DYSPHAGIA, UNSPECIFIED: ICD-10-CM

## 2024-03-25 DIAGNOSIS — E83.42 HYPOMAGNESEMIA: ICD-10-CM

## 2024-03-25 DIAGNOSIS — Z86.73 PERSONAL HISTORY OF TRANSIENT ISCHEMIC ATTACK (TIA), AND CEREBRAL INFARCTION WITHOUT RESIDUAL DEFICITS: ICD-10-CM
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
## 2024-03-25 DIAGNOSIS — Z88.0 ALLERGY STATUS TO PENICILLIN: ICD-10-CM

## 2024-03-25 DIAGNOSIS — Z87.81 PERSONAL HISTORY OF (HEALED) TRAUMATIC FRACTURE: ICD-10-CM

## 2024-03-25 DIAGNOSIS — E86.0 DEHYDRATION: ICD-10-CM

## 2024-03-25 DIAGNOSIS — S32.511A FRACTURE OF SUPERIOR RIM OF RIGHT PUBIS, INITIAL ENCOUNTER FOR CLOSED FRACTURE: ICD-10-CM

## 2024-03-25 DIAGNOSIS — K27.9 PEPTIC ULCER, SITE UNSPECIFIED, UNSPECIFIED AS ACUTE OR CHRONIC, WITHOUT HEMORRHAGE OR PERFORATION: ICD-10-CM

## 2024-03-25 DIAGNOSIS — E87.6 HYPOKALEMIA: ICD-10-CM

## 2024-03-25 DIAGNOSIS — Z91.041 RADIOGRAPHIC DYE ALLERGY STATUS: ICD-10-CM
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
## 2024-03-25 DIAGNOSIS — G93.41 METABOLIC ENCEPHALOPATHY: ICD-10-CM

## 2024-03-25 DIAGNOSIS — I16.0 HYPERTENSIVE URGENCY: ICD-10-CM

## 2024-03-25 DIAGNOSIS — F05 DELIRIUM DUE TO KNOWN PHYSIOLOGICAL CONDITION: ICD-10-CM
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
## 2024-03-25 DIAGNOSIS — E87.20 ACIDOSIS, UNSPECIFIED: ICD-10-CM

## 2024-03-25 DIAGNOSIS — I10 ESSENTIAL (PRIMARY) HYPERTENSION: ICD-10-CM

## 2024-03-25 DIAGNOSIS — R26.2 DIFFICULTY IN WALKING, NOT ELSEWHERE CLASSIFIED: ICD-10-CM

## 2024-04-23 ENCOUNTER — RX RENEWAL (OUTPATIENT)
Age: 79
End: 2024-04-23

## 2024-04-30 NOTE — ED ADULT NURSE NOTE - MUSCULOSKELETAL WDL
PleurX Catheter Discharge Instructions    1. Activity:  Climb stairs as tolerated  Limit lifting/pushing/pulling to less than 8-10 pounds for the first 2 weeks    2. Nutrition:  Resume previous diet    3. Pleurx Catheter Site Care:  Home health will be ordered for you. They will come to your home and provide dressing care and draining your catheter.  You may decide to care for your catheter without home health. That is fine but it is their reponsibility to set that up so you can order your own catheters.  You are allowed to shower if your catheter dressing is intact.    4. When to call for medical advice:  Fever greater than 101.2 degrees throughout the day  If your drainage has a foul odor/color  We will discuss whether it is necessary to return to the office  With questions or problems that cannot be answered by your home health nurse.     Full range of motion of upper and lower extremities, no joint tenderness/swelling.

## 2024-05-13 RX ORDER — LOSARTAN POTASSIUM 50 MG/1
50 TABLET, FILM COATED ORAL DAILY
Refills: 0 | Status: ACTIVE | COMMUNITY

## 2024-05-13 RX ORDER — ACETAMINOPHEN 325 MG/1
325 TABLET ORAL EVERY 6 HOURS
Refills: 0 | Status: ACTIVE | COMMUNITY

## 2024-05-13 RX ORDER — FERROUS SULFATE TAB EC 324 MG (65 MG FE EQUIVALENT) 324 (65 FE) MG
324 (65 FE) TABLET DELAYED RESPONSE ORAL DAILY
Refills: 0 | Status: ACTIVE | COMMUNITY

## 2024-05-13 RX ORDER — DULOXETINE HYDROCHLORIDE 20 MG/1
20 CAPSULE, DELAYED RELEASE PELLETS ORAL DAILY
Refills: 0 | Status: ACTIVE | COMMUNITY

## 2024-05-14 ENCOUNTER — APPOINTMENT (OUTPATIENT)
Dept: FAMILY MEDICINE | Facility: CLINIC | Age: 79
End: 2024-05-14

## 2024-05-14 RX ORDER — ALPRAZOLAM 0.25 MG/1
0.25 TABLET ORAL TWICE DAILY
Qty: 60 | Refills: 0 | Status: ACTIVE | COMMUNITY
Start: 2023-12-15 | End: 1900-01-01

## 2024-05-20 RX ORDER — QUETIAPINE FUMARATE 25 MG/1
25 TABLET ORAL
Qty: 180 | Refills: 5 | Status: ACTIVE | COMMUNITY
Start: 2024-02-21 | End: 1900-01-01